# Patient Record
Sex: FEMALE | Race: WHITE | Employment: UNEMPLOYED | URBAN - METROPOLITAN AREA
[De-identification: names, ages, dates, MRNs, and addresses within clinical notes are randomized per-mention and may not be internally consistent; named-entity substitution may affect disease eponyms.]

---

## 2024-11-04 ENCOUNTER — OFFICE VISIT (OUTPATIENT)
Dept: ENDOCRINOLOGY | Facility: CLINIC | Age: 59
End: 2024-11-04

## 2024-11-04 VITALS
TEMPERATURE: 97.6 F | WEIGHT: 138 LBS | HEART RATE: 63 BPM | DIASTOLIC BLOOD PRESSURE: 58 MMHG | SYSTOLIC BLOOD PRESSURE: 100 MMHG | HEIGHT: 68 IN | BODY MASS INDEX: 20.92 KG/M2 | OXYGEN SATURATION: 98 %

## 2024-11-04 DIAGNOSIS — E61.1 IRON DEFICIENCY: ICD-10-CM

## 2024-11-04 DIAGNOSIS — E21.3 HYPERPARATHYROIDISM (HCC): Primary | ICD-10-CM

## 2024-11-04 DIAGNOSIS — E83.52 HYPERCALCEMIA: ICD-10-CM

## 2024-11-04 DIAGNOSIS — N91.2 AMENORRHEA: ICD-10-CM

## 2024-11-04 DIAGNOSIS — M85.80 OSTEOPENIA, UNSPECIFIED LOCATION: ICD-10-CM

## 2024-11-04 DIAGNOSIS — E03.8 OTHER SPECIFIED HYPOTHYROIDISM: ICD-10-CM

## 2024-11-04 PROCEDURE — 99205 OFFICE O/P NEW HI 60 MIN: CPT | Performed by: INTERNAL MEDICINE

## 2024-11-04 NOTE — ASSESSMENT & PLAN NOTE
She had all data consistent with a Rt inferior parathyroid adenoma as culprit for hypercalcemia but post surgery, she did not have improvement in either hypercalcemia or PTH levels.    Today we discussed all aspects of hyperparathyroidism, both primary and secondary, normal parathyroid physiology, pathophysiology, review of data, treatment options, rule of sestamibi/CT parathyroid and follow up needs.    We agreed to repeat complete testing again based on which we may decide further options.  Follow up in 3 months.

## 2024-11-04 NOTE — ASSESSMENT & PLAN NOTE
Today we discussed all aspects of osteoporosis including pathophysiology, risk factors, complications, diet, calcium 1200mg/day, vitamin D supplementation to maintain goal >30ng/dL, lifestyle modifications, medical fitness training, goals of therapy, follow up needs and medications including Alendronate, zolendronate, denosumab, romosozumab, foreo and tymlos.

## 2024-11-04 NOTE — ASSESSMENT & PLAN NOTE
She reports some symptoms that are consistent with hypothyroidism but there was no clear biochemical evidence.    Today we discussed all aspects of hypothyroidism including normal thyroid physiology, pathophysiology, review of data, treatment options, dose titration and follow up needs.    Continue levothyroxine 25mcg qdaily.

## 2024-11-04 NOTE — PROGRESS NOTES
"    New patient Note      CC: hyperparathyroidism    History of Present Illness:   58 yr female with Hx hypothyroidism diagnosed age 27(did not take meds until recently), menorrhagia s/p D&C 2/23, LMP 2022, hyperparathyroidism, hypercalcemia.     She felt severe fatigue associated with sleep disturbance, agitation, mood disturbance, brain fog and polyuria. She had initial labs 3/2024 showing hypothyroidism, hypercalcemia dn iron deficiency. No Hx nephrolithiasis.    24 hr urine 5/6/2024 - calcium was 611mg/24 hrs with 3.5liters UOP.  7/28/24 PTH 66pg/mL, calcium 11.4mg/dL, Alb 4.7g/dL, fT4 0.9 ng/dL, TSH  3.5uIU/mL  7/25/24 US thyroid: 1.3cm*0.8cm*0.8cm Rt circumscribes mass inferior  5/31/2024 Nuclear sestamibi: Rt inferior parathyroid adenoma.  5/6/2024 DXA: osteopenia -  T scores -1.1 LS, -1.4 LTH, -1.7 LFN. 10 yr FRAX 0.7% hip and 6.8% major fracture.  8/19/2024 Surgical parathyroidectomy - Rt inferior parathyroid    Physical Exam:  Body mass index is 20.98 kg/m².  /58 (BP Location: Left arm, Patient Position: Sitting, Cuff Size: Standard)   Pulse 63   Temp 97.6 °F (36.4 °C) (Temporal)   Ht 5' 8\" (1.727 m)   Wt 62.6 kg (138 lb)   SpO2 98%   BMI 20.98 kg/m²    Vitals:    11/04/24 1134   Weight: 62.6 kg (138 lb)        Physical Exam  Constitutional:       General: She is not in acute distress.     Appearance: She is well-developed.   HENT:      Head: Normocephalic and atraumatic.      Nose: Nose normal.   Eyes:      Conjunctiva/sclera: Conjunctivae normal.   Pulmonary:      Effort: Pulmonary effort is normal.   Abdominal:      General: There is no distension.   Musculoskeletal:      Cervical back: Normal range of motion and neck supple.   Skin:     Findings: No rash.      Comments: No icterus   Neurological:      Mental Status: She is alert and oriented to person, place, and time.         Labs:   No results found for: \"HGBA1C\"    No results found for: \"MAQ7ZUCVYUEO\", \"TSH\", \"J2TIOJQ\", \"P7YTKBQ\", " "\"THYROIDAB\"    No results found for: \"CREATININE\", \"BUN\", \"NA\", \"K\", \"CL\", \"CO2\"  No results found for: \"EGFR\"    No results found for: \"ALT\", \"AST\", \"GGT\", \"ALKPHOS\", \"BILITOT\"    No results found for: \"CHOLESTEROL\"  No results found for: \"HDL\"  No results found for: \"TRIG\"  No results found for: \"NONHDLC\"      Assessment/Plan:    1. Hyperparathyroidism (HCC)  Assessment & Plan:  She had all data consistent with a Rt inferior parathyroid adenoma as culprit for hypercalcemia but post surgery, she did not have improvement in either hypercalcemia or PTH levels.    Today we discussed all aspects of hyperparathyroidism, both primary and secondary, normal parathyroid physiology, pathophysiology, review of data, treatment options, rule of sestamibi/CT parathyroid and follow up needs.    We agreed to repeat complete testing again based on which we may decide further options.  Follow up in 3 months.    Orders:  -     Vitamin D 25 hydroxy; Future  -     PTH, intact; Future  -     Protein electrophoresis, serum; Future  -     Protein electrophoresis, urine; Future  -     Phosphorus; Future  -     Calcium, ionized; Future  -     Alkaline phosphatase, bone specific; Future  -     Comprehensive metabolic panel; Future  -     Creatinine, urine, 24 hour; Future  -     Calcium, urine, 24 hour; Future  2. Hypercalcemia  Assessment & Plan:  As above  Orders:  -     Vitamin D 25 hydroxy; Future  -     PTH, intact; Future  -     Protein electrophoresis, serum; Future  -     Protein electrophoresis, urine; Future  -     Phosphorus; Future  -     Calcium, ionized; Future  -     Alkaline phosphatase, bone specific; Future  -     Comprehensive metabolic panel; Future  -     Creatinine, urine, 24 hour; Future  -     Calcium, urine, 24 hour; Future  -     PTH-related peptide; Future  3. Iron deficiency  Assessment & Plan:  Advised to continue iron supplementation.  4. Other specified hypothyroidism  Assessment & Plan:  She reports some " symptoms that are consistent with hypothyroidism but there was no clear biochemical evidence.    Today we discussed all aspects of hypothyroidism including normal thyroid physiology, pathophysiology, review of data, treatment options, dose titration and follow up needs.    Continue levothyroxine 25mcg qdaily.      Orders:  -     T4, free; Future  -     TSH, 3rd generation; Future  -     Thyroid Antibodies Panel; Future  5. Osteopenia, unspecified location  Assessment & Plan:  Today we discussed all aspects of osteoporosis including pathophysiology, risk factors, complications, diet, calcium 1200mg/day, vitamin D supplementation to maintain goal >30ng/dL, lifestyle modifications, medical fitness training, goals of therapy, follow up needs and medications including Alendronate, zolendronate, denosumab, romosozumab, foreo and tymlos.  6. Amenorrhea  -     Estradiol; Future  -     Follicle stimulating hormone; Future; Expected date: 11/05/2024  -     Luteinizing hormone; Future  -     Prolactin; Future        I have spent a total time of 45 minutes on 11/04/24 in caring for this patient including greater than 50% of this time was spent in counseling/coordination of care as listed above.       Discussed with the patient and all questioned fully answered. She will contact me with concerns.    Kevin Ellis

## 2024-11-05 ENCOUNTER — APPOINTMENT (OUTPATIENT)
Dept: LAB | Facility: HOSPITAL | Age: 59
End: 2024-11-05
Attending: INTERNAL MEDICINE

## 2024-11-05 DIAGNOSIS — E83.52 HYPERCALCEMIA: ICD-10-CM

## 2024-11-05 DIAGNOSIS — E03.8 OTHER SPECIFIED HYPOTHYROIDISM: ICD-10-CM

## 2024-11-05 DIAGNOSIS — N91.2 AMENORRHEA: ICD-10-CM

## 2024-11-05 DIAGNOSIS — E21.3 HYPERPARATHYROIDISM (HCC): ICD-10-CM

## 2024-11-05 LAB
25(OH)D3 SERPL-MCNC: 29.8 NG/ML (ref 30–100)
ALBUMIN SERPL BCG-MCNC: 4.7 G/DL (ref 3.5–5)
ALP SERPL-CCNC: 81 U/L (ref 34–104)
ALT SERPL W P-5'-P-CCNC: 16 U/L (ref 7–52)
ANION GAP SERPL CALCULATED.3IONS-SCNC: 6 MMOL/L (ref 4–13)
AST SERPL W P-5'-P-CCNC: 22 U/L (ref 13–39)
BILIRUB SERPL-MCNC: 0.57 MG/DL (ref 0.2–1)
BUN SERPL-MCNC: 17 MG/DL (ref 5–25)
CA-I BLD-SCNC: 1.34 MMOL/L (ref 1.12–1.32)
CALCIUM SERPL-MCNC: 11.1 MG/DL (ref 8.4–10.2)
CHLORIDE SERPL-SCNC: 104 MMOL/L (ref 96–108)
CO2 SERPL-SCNC: 29 MMOL/L (ref 21–32)
CREAT SERPL-MCNC: 0.95 MG/DL (ref 0.6–1.3)
ESTRADIOL SERPL-MCNC: 37.3 PG/ML
FSH SERPL-ACNC: 85.3 MIU/ML
GFR SERPL CREATININE-BSD FRML MDRD: 67 ML/MIN/1.73SQ M
GLUCOSE P FAST SERPL-MCNC: 90 MG/DL (ref 65–99)
LH SERPL-ACNC: 56.3 MIU/ML
PHOSPHATE SERPL-MCNC: 3.4 MG/DL (ref 2.7–4.5)
POTASSIUM SERPL-SCNC: 4 MMOL/L (ref 3.5–5.3)
PROLACTIN SERPL-MCNC: 11.64 NG/ML (ref 2.74–19.64)
PROT SERPL-MCNC: 7.6 G/DL (ref 6.4–8.4)
PTH-INTACT SERPL-MCNC: 56.3 PG/ML (ref 12–88)
SODIUM SERPL-SCNC: 139 MMOL/L (ref 135–147)
T4 FREE SERPL-MCNC: 0.72 NG/DL (ref 0.61–1.12)
TSH SERPL DL<=0.05 MIU/L-ACNC: 4.78 UIU/ML (ref 0.45–4.5)

## 2024-11-05 PROCEDURE — 84166 PROTEIN E-PHORESIS/URINE/CSF: CPT

## 2024-11-05 PROCEDURE — 84443 ASSAY THYROID STIM HORMONE: CPT

## 2024-11-05 PROCEDURE — 86376 MICROSOMAL ANTIBODY EACH: CPT

## 2024-11-05 PROCEDURE — 82306 VITAMIN D 25 HYDROXY: CPT

## 2024-11-05 PROCEDURE — 82397 CHEMILUMINESCENT ASSAY: CPT

## 2024-11-05 PROCEDURE — 83002 ASSAY OF GONADOTROPIN (LH): CPT

## 2024-11-05 PROCEDURE — 84146 ASSAY OF PROLACTIN: CPT

## 2024-11-05 PROCEDURE — 36415 COLL VENOUS BLD VENIPUNCTURE: CPT

## 2024-11-05 PROCEDURE — 82670 ASSAY OF TOTAL ESTRADIOL: CPT

## 2024-11-05 PROCEDURE — 86800 THYROGLOBULIN ANTIBODY: CPT

## 2024-11-05 PROCEDURE — 82330 ASSAY OF CALCIUM: CPT

## 2024-11-05 PROCEDURE — 80053 COMPREHEN METABOLIC PANEL: CPT

## 2024-11-05 PROCEDURE — 84100 ASSAY OF PHOSPHORUS: CPT

## 2024-11-05 PROCEDURE — 84165 PROTEIN E-PHORESIS SERUM: CPT

## 2024-11-05 PROCEDURE — 84439 ASSAY OF FREE THYROXINE: CPT

## 2024-11-05 PROCEDURE — 84080 ASSAY ALKALINE PHOSPHATASES: CPT

## 2024-11-05 PROCEDURE — 83970 ASSAY OF PARATHORMONE: CPT

## 2024-11-05 PROCEDURE — 83001 ASSAY OF GONADOTROPIN (FSH): CPT

## 2024-11-06 LAB — THYROGLOB AB SERPL-ACNC: 1.4 IU/ML (ref 0–0.9)

## 2024-11-07 ENCOUNTER — APPOINTMENT (OUTPATIENT)
Dept: LAB | Facility: HOSPITAL | Age: 59
End: 2024-11-07

## 2024-11-07 DIAGNOSIS — E21.3 HYPERPARATHYROIDISM, UNSPECIFIED (HCC): ICD-10-CM

## 2024-11-07 DIAGNOSIS — E83.52 HYPERCALCEMIA: ICD-10-CM

## 2024-11-07 LAB
ALBUMIN SERPL ELPH-MCNC: 4.5 G/DL (ref 3.2–5.1)
ALBUMIN SERPL ELPH-MCNC: 61.6 % (ref 48–70)
ALBUMIN UR ELPH-MCNC: 100 %
ALP BONE SERPL-MCNC: 26.2 UG/L
ALPHA1 GLOB MFR UR ELPH: 0 %
ALPHA1 GLOB SERPL ELPH-MCNC: 0.21 G/DL (ref 0.15–0.47)
ALPHA1 GLOB SERPL ELPH-MCNC: 2.9 % (ref 1.8–7)
ALPHA2 GLOB MFR UR ELPH: 0 %
ALPHA2 GLOB SERPL ELPH-MCNC: 0.63 G/DL (ref 0.42–1.04)
ALPHA2 GLOB SERPL ELPH-MCNC: 8.6 % (ref 5.9–14.9)
B-GLOBULIN MFR UR ELPH: 0 %
BETA GLOB ABNORMAL SERPL ELPH-MCNC: 0.41 G/DL (ref 0.31–0.57)
BETA1 GLOB SERPL ELPH-MCNC: 5.6 % (ref 4.7–7.7)
BETA2 GLOB SERPL ELPH-MCNC: 5.6 % (ref 3.1–7.9)
BETA2+GAMMA GLOB SERPL ELPH-MCNC: 0.41 G/DL (ref 0.2–0.58)
CALCIUM 24H UR-MCNC: 427 MG/24 HRS (ref 100–300)
CREAT 24H UR-MRATE: 1.6 G/24HR (ref 0.6–1.8)
GAMMA GLOB ABNORMAL SERPL ELPH-MCNC: 1.15 G/DL (ref 0.4–1.66)
GAMMA GLOB MFR UR ELPH: 0 %
GAMMA GLOB SERPL ELPH-MCNC: 15.7 % (ref 6.9–22.3)
IGG/ALB SER: 1.6 {RATIO} (ref 1.1–1.8)
PROT PATTERN SERPL ELPH-IMP: NORMAL
PROT PATTERN UR ELPH-IMP: NORMAL
PROT SERPL-MCNC: 7.3 G/DL (ref 6.4–8.2)
PROT UR-MCNC: 5.9 MG/DL
SPECIMEN VOL UR: 3500 ML
SPECIMEN VOL UR: 3500 ML
THYROPEROXIDASE AB SERPL-ACNC: 159 IU/ML (ref 0–34)

## 2024-11-07 PROCEDURE — 82340 ASSAY OF CALCIUM IN URINE: CPT

## 2024-11-07 PROCEDURE — 84165 PROTEIN E-PHORESIS SERUM: CPT | Performed by: STUDENT IN AN ORGANIZED HEALTH CARE EDUCATION/TRAINING PROGRAM

## 2024-11-07 PROCEDURE — 82570 ASSAY OF URINE CREATININE: CPT

## 2024-11-07 PROCEDURE — 84166 PROTEIN E-PHORESIS/URINE/CSF: CPT | Performed by: STUDENT IN AN ORGANIZED HEALTH CARE EDUCATION/TRAINING PROGRAM

## 2024-11-13 LAB — PTH RELATED PROT SERPL-SCNC: 2.9 PMOL/L

## 2024-11-19 DIAGNOSIS — E21.0 PRIMARY HYPERPARATHYROIDISM (HCC): ICD-10-CM

## 2024-11-19 DIAGNOSIS — E21.3 HYPERPARATHYROIDISM (HCC): Primary | ICD-10-CM

## 2024-11-19 DIAGNOSIS — E03.8 OTHER SPECIFIED HYPOTHYROIDISM: ICD-10-CM

## 2024-11-19 RX ORDER — LEVOTHYROXINE SODIUM 50 UG/1
50 TABLET ORAL DAILY
Qty: 90 TABLET | Refills: 1 | Status: SHIPPED | OUTPATIENT
Start: 2024-11-19

## 2024-11-22 ENCOUNTER — OFFICE VISIT (OUTPATIENT)
Dept: INTERNAL MEDICINE CLINIC | Facility: CLINIC | Age: 59
End: 2024-11-22
Payer: COMMERCIAL

## 2024-11-22 VITALS
SYSTOLIC BLOOD PRESSURE: 120 MMHG | BODY MASS INDEX: 21.37 KG/M2 | HEIGHT: 68 IN | DIASTOLIC BLOOD PRESSURE: 62 MMHG | WEIGHT: 141 LBS | HEART RATE: 54 BPM | OXYGEN SATURATION: 100 % | TEMPERATURE: 96.8 F

## 2024-11-22 DIAGNOSIS — M54.50 CHRONIC BILATERAL LOW BACK PAIN WITHOUT SCIATICA: ICD-10-CM

## 2024-11-22 DIAGNOSIS — E21.3 HYPERPARATHYROIDISM (HCC): Primary | ICD-10-CM

## 2024-11-22 DIAGNOSIS — Z00.00 LABORATORY EXAMINATION ORDERED AS PART OF A ROUTINE GENERAL MEDICAL EXAMINATION: ICD-10-CM

## 2024-11-22 DIAGNOSIS — E03.8 OTHER SPECIFIED HYPOTHYROIDISM: ICD-10-CM

## 2024-11-22 DIAGNOSIS — G89.29 CHRONIC BILATERAL LOW BACK PAIN WITHOUT SCIATICA: ICD-10-CM

## 2024-11-22 DIAGNOSIS — E55.9 VITAMIN D DEFICIENCY: ICD-10-CM

## 2024-11-22 DIAGNOSIS — Z71.9 HEALTH COUNSELING: ICD-10-CM

## 2024-11-22 DIAGNOSIS — M25.551 RIGHT HIP PAIN: ICD-10-CM

## 2024-11-22 DIAGNOSIS — E78.49 OTHER HYPERLIPIDEMIA: ICD-10-CM

## 2024-11-22 DIAGNOSIS — M85.80 OSTEOPENIA, UNSPECIFIED LOCATION: ICD-10-CM

## 2024-11-22 DIAGNOSIS — E61.1 IRON DEFICIENCY: ICD-10-CM

## 2024-11-22 PROCEDURE — 99204 OFFICE O/P NEW MOD 45 MIN: CPT | Performed by: INTERNAL MEDICINE

## 2024-11-22 RX ORDER — AMOXICILLIN 500 MG
1 CAPSULE ORAL
Start: 2024-11-22

## 2024-11-22 RX ORDER — ACETAMINOPHEN 160 MG
2000 TABLET,DISINTEGRATING ORAL DAILY
Start: 2024-11-22

## 2024-11-22 NOTE — ASSESSMENT & PLAN NOTE
Start daily D3.    Orders:    Cholecalciferol (Vitamin D3) 50 MCG (2000 UT) capsule; Take 1 capsule (2,000 Units total) by mouth daily

## 2024-11-22 NOTE — PROGRESS NOTES
Name: Shawna Marrufo      : 1965      MRN: [id number]  Encounter Provider: Brenda Kaminski MD  Encounter Date: 2024   Encounter department: [facility] INTERNAL MEDICINE  :  Assessment & Plan  Hyperparathyroidism (HCC)  Saw Endo. CT scan scheduled. S/p surgery.  Reviewed labs.         Other specified hypothyroidism  Taking medication appropriately. She will ask regarding brand medication.  Per Endo.         Vitamin D deficiency  Start daily D3.    Orders:    Cholecalciferol (Vitamin D3) 50 MCG (2000 UT) capsule; Take 1 capsule (2,000 Units total) by mouth daily    Chronic bilateral low back pain without sciatica  Intermittent symptoms. May benefit with physical therapy.  Explained MRI not necessary at this time.    Orders:    XR spine lumbar minimum 4 views non injury; Future    XR sacroiliac joints < 3 views; Future    Right hip pain  Differential Dx: bursitis, hip OA, lumbar radiculopathy.    Orders:    XR hip/pelv 2-3 vws right if performed; Future    Iron deficiency  Recheck labs, not taking iron supplement anymore.  S/p iron infusion .    Orders:    CBC and differential; Future    Iron Panel (Includes Ferritin, Iron Sat%, Iron, and TIBC); Future    Other hyperlipidemia  Repeat lipids, taking omega-3 only.    Orders:    Lipid panel; Future    Omega-3 Fatty Acids (Fish Oil) 1200 MG CAPS; Take 1 capsule (1,200 mg total) by mouth Daily at 2am    Osteopenia, unspecified location         Laboratory examination ordered as part of a routine general medical examination         Health counseling  Declined flu vaccine. COVID vaccine updated.  May be due for mammogram. She reports updated FIT test.       Follow up in 6 months or as needed.  Retrieve previous records.       History of Present Illness     She recently moved the area.  She had a very stressful job in [place] and moved, her sister lives here.  Several concerns.  First, she had a parathyroid removed in [place],  recently saw endocrinology in the area.  Her calcium remains high.  She has not been taking vitamin D.  She has a CT scan scheduled.    Second, she has been taking levothyroxine while in [place].  She is not sure why she was given Synthroid instead.    Third, she was previously having issues with sleep.  Right now, she reports sleep has improved.  She reports occasional headaches.  She was told it may be due to too much water.  She does wake up several times at night to urinate.  She reports this has improved since she decreased her fluid intake.    Fourth, she received an iron infusion last May, stopped taking iron supplements.  She was also told she had high cholesterol, started taking fish oil capsules.    Fifth, she complains of intermittent low back pain.  She reports right hip pain several months ago.  She thinks it may be due to being seated for work all the time.  She reports no falls or injury.  Pain does not radiate down her legs.  She experiences occasional tingling of her thighs, this is not consistent.  She has not applied or taken any medication for her symptoms. She would lik an MRI.      Review of Systems   Constitutional:  Negative for activity change, appetite change and fatigue.   HENT:  Negative for congestion, ear pain and postnasal drip.    Eyes:  Negative for visual disturbance.   Respiratory:  Negative for cough and shortness of breath.    Cardiovascular:  Negative for chest pain and leg swelling.   Gastrointestinal:  Negative for abdominal pain, constipation and diarrhea.   Genitourinary:  Negative for dysuria, frequency and urgency.   Musculoskeletal:  Positive for arthralgias and back pain. Negative for gait problem and myalgias.   Skin:  Negative for rash and wound.   Neurological:  Positive for headaches. Negative for dizziness and numbness.   Hematological:  Does not bruise/bleed easily.   Psychiatric/Behavioral:  Negative for confusion. The patient is not nervous/anxious.      Past  "Medical History   History reviewed. No pertinent past medical history.  Past Surgical History:   Procedure Laterality Date    PARATHYROID GLAND SURGERY       Family History   Problem Relation Age of Onset    Stroke Mother     Dementia Father     Coronary artery disease Brother       reports that she has never smoked. She has never used smokeless tobacco. She reports current alcohol use of about 3.0 standard drinks of alcohol per week. She reports that she does not use drugs.  Current Outpatient Medications on File Prior to Visit   Medication Sig Dispense Refill    Euthyrox 50 MCG tablet Take 1 tablet (50 mcg total) by mouth daily 90 tablet 1     No current facility-administered medications on file prior to visit.     Allergies   Allergen Reactions    Sulfa Antibiotics Rash           Objective   /62   Pulse (!) 54   Temp (!) 96.8 °F (36 °C)   Ht 5' 8\" (1.727 m)   Wt 64 kg (141 lb)   SpO2 100%   BMI 21.44 kg/m²      Physical Exam  Vitals and nursing note reviewed.   Constitutional:       General: She is not in acute distress.     Appearance: She is well-developed.   HENT:      Head: Normocephalic and atraumatic.   Eyes:      Pupils: Pupils are equal, round, and reactive to light.   Cardiovascular:      Rate and Rhythm: Normal rate and regular rhythm.      Heart sounds: Normal heart sounds.   Pulmonary:      Effort: Pulmonary effort is normal.      Breath sounds: Normal breath sounds. No wheezing.   Abdominal:      General: Bowel sounds are normal.      Palpations: Abdomen is soft.   Musculoskeletal:         General: No swelling.      Right lower leg: No edema.      Left lower leg: No edema.   Skin:     General: Skin is warm.      Findings: No rash.   Neurological:      General: No focal deficit present.      Mental Status: She is alert and oriented to person, place, and time.   Psychiatric:         Mood and Affect: Mood and affect normal. Mood is not anxious or depressed.         Behavior: Behavior normal. "           Reviewed available records.  Labs & imaging results reviewed with patient.

## 2024-11-22 NOTE — ASSESSMENT & PLAN NOTE
Recheck labs, not taking iron supplement anymore.  S/p iron infusion 5/24.    Orders:    CBC and differential; Future    Iron Panel (Includes Ferritin, Iron Sat%, Iron, and TIBC); Future

## 2024-11-25 ENCOUNTER — RESULTS FOLLOW-UP (OUTPATIENT)
Dept: OTHER | Facility: HOSPITAL | Age: 59
End: 2024-11-25

## 2024-11-25 DIAGNOSIS — E03.8 OTHER SPECIFIED HYPOTHYROIDISM: ICD-10-CM

## 2024-11-25 RX ORDER — LEVOTHYROXINE SODIUM 50 MCG
50 TABLET ORAL DAILY
Qty: 90 TABLET | Refills: 0 | Status: SHIPPED | OUTPATIENT
Start: 2024-11-25 | End: 2024-11-25 | Stop reason: SDUPTHER

## 2024-11-25 RX ORDER — LEVOTHYROXINE SODIUM 50 UG/1
50 TABLET ORAL DAILY
Qty: 90 TABLET | Refills: 0 | Status: SHIPPED | OUTPATIENT
Start: 2024-11-25

## 2024-12-02 ENCOUNTER — HOSPITAL ENCOUNTER (OUTPATIENT)
Dept: RADIOLOGY | Facility: HOSPITAL | Age: 59
Discharge: HOME | End: 2024-12-02
Payer: COMMERCIAL

## 2024-12-02 ENCOUNTER — APPOINTMENT (OUTPATIENT)
Dept: LAB | Facility: CLINIC | Age: 59
End: 2024-12-02
Payer: COMMERCIAL

## 2024-12-02 ENCOUNTER — PATIENT MESSAGE (OUTPATIENT)
Dept: INTERNAL MEDICINE CLINIC | Facility: CLINIC | Age: 59
End: 2024-12-02

## 2024-12-02 DIAGNOSIS — E61.1 IRON DEFICIENCY: Primary | ICD-10-CM

## 2024-12-02 DIAGNOSIS — E78.49 OTHER HYPERLIPIDEMIA: ICD-10-CM

## 2024-12-02 DIAGNOSIS — M54.50 CHRONIC BILATERAL LOW BACK PAIN WITHOUT SCIATICA: ICD-10-CM

## 2024-12-02 DIAGNOSIS — M25.551 RIGHT HIP PAIN: ICD-10-CM

## 2024-12-02 DIAGNOSIS — E61.1 IRON DEFICIENCY: ICD-10-CM

## 2024-12-02 DIAGNOSIS — G89.29 CHRONIC BILATERAL LOW BACK PAIN WITHOUT SCIATICA: ICD-10-CM

## 2024-12-02 LAB
BASOPHILS # BLD AUTO: 0.06 THOUSANDS/ΜL (ref 0–0.1)
BASOPHILS NFR BLD AUTO: 1 % (ref 0–1)
EOSINOPHIL # BLD AUTO: 0.02 THOUSAND/ΜL (ref 0–0.61)
EOSINOPHIL NFR BLD AUTO: 0 % (ref 0–6)
ERYTHROCYTE [DISTWIDTH] IN BLOOD BY AUTOMATED COUNT: 12.3 % (ref 11.6–15.1)
FERRITIN SERPL-MCNC: 403 NG/ML (ref 11–307)
HCT VFR BLD AUTO: 36 % (ref 34.8–46.1)
HGB BLD-MCNC: 11.7 G/DL (ref 11.5–15.4)
IMM GRANULOCYTES # BLD AUTO: 0.01 THOUSAND/UL (ref 0–0.2)
IMM GRANULOCYTES NFR BLD AUTO: 0 % (ref 0–2)
IRON SATN MFR SERPL: 24 % (ref 15–50)
IRON SERPL-MCNC: 75 UG/DL (ref 50–212)
LYMPHOCYTES # BLD AUTO: 3.76 THOUSANDS/ΜL (ref 0.6–4.47)
LYMPHOCYTES NFR BLD AUTO: 65 % (ref 14–44)
MCH RBC QN AUTO: 30.4 PG (ref 26.8–34.3)
MCHC RBC AUTO-ENTMCNC: 32.5 G/DL (ref 31.4–37.4)
MCV RBC AUTO: 94 FL (ref 82–98)
MONOCYTES # BLD AUTO: 0.41 THOUSAND/ΜL (ref 0.17–1.22)
MONOCYTES NFR BLD AUTO: 7 % (ref 4–12)
NEUTROPHILS # BLD AUTO: 1.53 THOUSANDS/ΜL (ref 1.85–7.62)
NEUTS SEG NFR BLD AUTO: 27 % (ref 43–75)
NRBC BLD AUTO-RTO: 0 /100 WBCS
PLATELET # BLD AUTO: 203 THOUSANDS/UL (ref 149–390)
PMV BLD AUTO: 9.6 FL (ref 8.9–12.7)
RBC # BLD AUTO: 3.85 MILLION/UL (ref 3.81–5.12)
TIBC SERPL-MCNC: 310 UG/DL (ref 250–450)
UIBC SERPL-MCNC: 235 UG/DL (ref 155–355)
WBC # BLD AUTO: 5.79 THOUSAND/UL (ref 4.31–10.16)

## 2024-12-02 PROCEDURE — 85025 COMPLETE CBC W/AUTO DIFF WBC: CPT

## 2024-12-02 PROCEDURE — 73502 X-RAY EXAM HIP UNI 2-3 VIEWS: CPT

## 2024-12-02 PROCEDURE — 72110 X-RAY EXAM L-2 SPINE 4/>VWS: CPT

## 2024-12-02 PROCEDURE — 36415 COLL VENOUS BLD VENIPUNCTURE: CPT

## 2024-12-02 PROCEDURE — 72200 X-RAY EXAM SI JOINTS: CPT

## 2024-12-02 PROCEDURE — 83550 IRON BINDING TEST: CPT

## 2024-12-02 PROCEDURE — 83540 ASSAY OF IRON: CPT

## 2024-12-02 PROCEDURE — 82728 ASSAY OF FERRITIN: CPT

## 2024-12-09 ENCOUNTER — HOSPITAL ENCOUNTER (OUTPATIENT)
Dept: CT IMAGING | Facility: HOSPITAL | Age: 59
Discharge: HOME | End: 2024-12-09
Attending: INTERNAL MEDICINE
Payer: COMMERCIAL

## 2024-12-09 DIAGNOSIS — E21.0 PRIMARY HYPERPARATHYROIDISM (HCC): ICD-10-CM

## 2024-12-09 DIAGNOSIS — E21.3 HYPERPARATHYROIDISM (HCC): ICD-10-CM

## 2024-12-09 PROCEDURE — 70492 CT SFT TSUE NCK W/O & W/DYE: CPT

## 2024-12-09 RX ADMIN — IOHEXOL 75 ML: 350 INJECTION, SOLUTION INTRAVENOUS at 07:29

## 2024-12-10 ENCOUNTER — TELEPHONE (OUTPATIENT)
Age: 59
End: 2024-12-10

## 2024-12-10 NOTE — TELEPHONE ENCOUNTER
[facility] Radiology    12/09/24 CT Parathyroid   Dr. Ellis    Significant Finding

## 2024-12-17 DIAGNOSIS — E21.3 HYPERPARATHYROIDISM (HCC): Primary | ICD-10-CM

## 2024-12-17 DIAGNOSIS — E21.0 PRIMARY HYPERPARATHYROIDISM (HCC): ICD-10-CM

## 2024-12-23 ENCOUNTER — OFFICE VISIT (OUTPATIENT)
Dept: SURGICAL ONCOLOGY | Facility: CLINIC | Age: 59
End: 2024-12-23
Payer: COMMERCIAL

## 2024-12-23 VITALS
OXYGEN SATURATION: 99 % | TEMPERATURE: 98.2 F | HEART RATE: 73 BPM | BODY MASS INDEX: 20.95 KG/M2 | SYSTOLIC BLOOD PRESSURE: 124 MMHG | HEIGHT: 68 IN | DIASTOLIC BLOOD PRESSURE: 66 MMHG | WEIGHT: 138.2 LBS | RESPIRATION RATE: 16 BRPM

## 2024-12-23 DIAGNOSIS — E21.0 PRIMARY HYPERPARATHYROIDISM (HCC): ICD-10-CM

## 2024-12-23 DIAGNOSIS — E83.52 HYPERCALCEMIA: ICD-10-CM

## 2024-12-23 DIAGNOSIS — E21.3 HYPERPARATHYROIDISM (HCC): Primary | ICD-10-CM

## 2024-12-23 PROCEDURE — 99244 OFF/OP CNSLTJ NEW/EST MOD 40: CPT | Performed by: SURGERY

## 2024-12-23 NOTE — PROGRESS NOTES
Surgical Oncology Consult       [facility] [place]  [address] [facility] [address]  [phone]    Shawna Keithorestesdebbie  1965  [id number]  [facility] [place]  [address] [facility] [address]  [phone]    Chief Complaint   Patient presents with    Consult     Patient being seen for consult for PTH 56.3, Ca 11.1. CT para 12/9/2024- poss right.       Assessment/Plan:    No problem-specific Assessment & Plan notes found for this encounter.       Diagnoses and all orders for this visit:    Hyperparathyroidism (HCC)    Hypercalcemia    Primary hyperparathyroidism (HCC)  -     Ambulatory referral to Surgical Oncology      Advance Care Planning/Advance Directives:  Discussed disease status, cancer treatment plans and/or cancer treatment goals with the patient.     Oncology History    No history exists.       History of Present Illness: 59-year-old woman recently moved here from [place] where she underwent parathyroidectomy in August of this year.  She has had persistent symptoms as well as persistent hypercalcemia.  She underwent CAT scan here which was suggestive of a right-sided target.  She has been referred for evaluation and treatment.  No history of kidney stones.  However she has issues with achiness, fatigue, constipation, and foggy thinking.  She states that she has a general sense of malaise.  No personal or family history of endocrine malignancies.    Review of Systems   Constitutional:  Positive for fatigue.   Eyes: Negative.    Respiratory: Negative.     Cardiovascular: Negative.    Gastrointestinal:  Positive for constipation.   Genitourinary: Negative.    Skin: Negative.    Allergic/Immunologic: Negative.    Hematological: Negative.    Psychiatric/Behavioral:          Difficulty sleeping.  Foggy thinking.         Patient Active Problem List    Diagnosis    Hyperparathyroidism (HCC)    Hypercalcemia    Other specified hypothyroidism    Iron deficiency    Osteopenia    Vitamin D deficiency     Past Medical History:   Diagnosis Date    Disease of thyroid gland     Headache(784.0)      Past Surgical History:   Procedure Laterality Date    KNEE SURGERY      PARATHYROID GLAND SURGERY       Family History   Problem Relation Age of Onset    Stroke Mother         She is 90. Stroke occurred 1 year ago.    Dementia Father     Coronary artery disease Brother     Heart disease Brother     Thyroid disease Sister      Social History     Socioeconomic History    Marital status: Single     Spouse name: Not on file    Number of children: Not on file    Years of education: Not on file    Highest education level: Not on file   Occupational History    Not on file   Tobacco Use    Smoking status: Never    Smokeless tobacco: Never   Vaping Use    Vaping status: Never Used   Substance and Sexual Activity    Alcohol use: Yes     Alcohol/week: 3.0 standard drinks of alcohol     Types: 3 Glasses of wine per week    Drug use: Never    Sexual activity: Not Currently     Partners: Male     Birth control/protection: Post-menopausal   Other Topics Concern    Not on file   Social History Narrative    Single    Previous work - in HR in [place]    Working - [employer]     Social Drivers of Health     Financial Resource Strain: Not on file   Food Insecurity: Not on file   Transportation Needs: Not on file   Physical Activity: Not on file   Stress: Not on file   Social Connections: Not on file   Intimate Partner Violence: Not on file   Housing Stability: Not on file       Current Outpatient Medications:     Cholecalciferol (Vitamin D3) 50 MCG (2000 UT) capsule, Take 1 capsule (2,000 Units total) by mouth daily, Disp: , Rfl:     levothyroxine (Synthroid) 50 mcg tablet, Take 1 tablet (50 mcg total) by mouth daily, Disp: 90 tablet, Rfl: 0    Omega-3 Fatty Acids (Fish Oil) 1200 MG CAPS, Take  1 capsule (1,200 mg total) by mouth Daily at 2am, Disp: , Rfl:   Allergies   Allergen Reactions    Sulfa Antibiotics Rash     Vitals:    12/23/24 1141   BP: 124/66   Pulse: 73   Resp: 16   Temp: 98.2 °F (36.8 °C)   SpO2: 99%       Physical Exam  Vitals reviewed.   Constitutional:       Appearance: Normal appearance.   HENT:      Head: Normocephalic and atraumatic.      Right Ear: External ear normal.      Left Ear: External ear normal.   Eyes:      Extraocular Movements: Extraocular movements intact.      Pupils: Pupils are equal, round, and reactive to light.   Cardiovascular:      Rate and Rhythm: Normal rate and regular rhythm.      Pulses: Normal pulses.      Heart sounds: Normal heart sounds.   Pulmonary:      Effort: Pulmonary effort is normal.      Breath sounds: Normal breath sounds.   Abdominal:      General: Abdomen is flat.      Palpations: Abdomen is soft.   Genitourinary:     General: Normal vulva.      Rectum: Normal.   Musculoskeletal:         General: No tenderness. Normal range of motion.      Cervical back: Normal range of motion and neck supple.   Skin:     General: Skin is warm and dry.   Neurological:      General: No focal deficit present.      Mental Status: She is alert and oriented to person, place, and time.   Psychiatric:         Mood and Affect: Mood normal.         Thought Content: Thought content normal.         Pathology:      Labs:  Lab Results   Component Value Date    PTH 56.3 11/05/2024    CALCIUM 11.1 (H) 11/05/2024    PHOS 3.4 11/05/2024         Imaging  CT parathyroid study w wo contrast  Result Date: 12/10/2024  Narrative: CTA  NECK  WITHOUT AND WITH CONTRAST FROM KERA TO SKULL BASE INDICATION: Hyperparathyroidism. primary hyperparathyroidism COMPARISON:  None. TECHNIQUE:  Both noncontrast, contrast, and post contrast delayed images were performed according to standard parathyroid protocol (4D technique) Coronal and sagittal reconstructions were performed. 3D reconstructions  were performed on an independent workstation, and are supplied for review.  This examination, like all CT scans performed in the [facility], was performed utilizing techniques to minimize radiation dose exposure, including the use of iterative reconstruction and automated exposure control.  Rad dose 1355.51 mGy-cm IV Contrast:  75 mL of iohexol (OMNIPAQUE) FINDINGS: SERIAL NONCONTRAST, POST CONTRAST AND DELAYS: 0.4 cm enhancing nodule in right tracheoesophageal groove posteriorly adjacent to right lower thyroid lobe (302:160). 1.0 cm heterogeneously enhancing nodule with internal cystic change and tiny calcification inferiorly adjacent to right lower thyroid lobe (302:147). VASCULAR STRUCTURES:  Normal enhancement of the cervical vasculature. Left ICA cervical loop and tortuous right ICA distal cervical segment. VISUALIZED BRAIN PARENCHYMA: Not included in the field-of-view. VISUALIZED PARANASAL SINUSES:  Normal. NASAL CAVITY AND NASOPHARYNX: Normal. SUPRAHYOID NECK: Dental amalgam with extensive beam hardening artifact even with metal artifact reduction limits evaluation of anterior oral cavity for which direct visualization is recommended. Small circumscribed hyperdensity in right buccal mucosa (301:292), likely ingested material. Otherwise, normal visualized oral cavity within limitations. Normal oropharynx, parapharyngeal and retropharyngeal spaces. INFRAHYOID NECK: Larynx and hypopharynx are normal. Glottic and subglottic airway are normal. THYROID GLAND:   Heterogeneously enhancing thyroid gland with bilateral subcentimeter nodules. Incidental discovery of one or more thyroid nodule(s) measuring less than 1.5 cm and without suspicious features is noted in this patient who is above 35 years  old; according to guidelines published in the February 2015 white paper on incidental thyroid nodules in the Journal of the American College of Radiology (JACR), no further evaluation is recommended. LYMPH  NODES:  No pathologic or enlarged adenopathy. MEDIASTINUM:  Unremarkable. BONY STRUCTURES: No acute fracture or destructive osseous lesion. Moderate multilevel degenerative changes of cervical spine, worse at C5-C6. LUNG APICES: Mild biapical fibrotic change. No focal consolidation in visualized upper lung zones. NASCET criteria was used to determine the degree of internal carotid artery diameter stenosis.     Impression: 0.4 cm candidate parathyroid adenoma in right tracheoesophageal groove posteriorly adjacent to right lower thyroid lobe 1.0 cm candidate parathyroid adenoma inferiorly adjacent to right lower thyroid lobe. Alternative consideration includes sequestered thyroid nodule. Small circumscribed hyperdensity in right buccal mucosa, likely ingested material (i.e. gum or hard candy). Recommend direct visualization for further evaluation. Additional chronic/incidental findings as detailed above. The study was marked in EPIC for significant notification. Workstation performed: [id number]     XR spine lumbar minimum 4 views non injury  Result Date: 12/3/2024  Narrative: LUMBAR SPINE INDICATION:   Low back pain, unspecified. Other chronic pain. COMPARISON:  None. VIEWS:  XR SPINE LUMBAR MINIMUM 4 VIEWS NON INJURY FINDINGS: There are 5 non rib bearing lumbar vertebral bodies. There is no destructive osseous lesion. Alignment is unremarkable. Mild disc space narrowing at L4-L5. The pedicles appear intact. Soft tissues are unremarkable.     Impression: Mild disc space narrowing at L4-L5. Electronically signed: 12/03/2024 09:38 AM Velasquez Ruiz MD    XR hip/pelv 2-3 vws right if performed  Result Date: 12/3/2024  Narrative: RIGHT HIP INDICATION:   Pain in right hip. COMPARISON:  None. VIEWS:  XR HIP/PELV 2-3 VWS RIGHT W PELVIS IF PERFORMED FINDINGS: There is no acute displaced fracture or dislocation. Slight right coxa profunda. Minimal degenerative changes right hip. No lytic or blastic osseous lesion. Soft tissues  are unremarkable. The visualized lumbar spine is unremarkable.     Impression: Slight right coxa profunda. Minimal degenerative changes right hip. Electronically signed: 12/03/2024 09:37 AM Velasquez Ruiz MD    XR sacroiliac joints < 3 views  Result Date: 12/3/2024  Narrative: SACROILIAC JOINTS INDICATION:   Low back pain, unspecified. Other chronic pain. COMPARISON:  None. VIEWS:  XR SACROILIAC JOINTS < 3 VIEWS FINDINGS: The SI joints appear symmetric without focal erosions or joint space widening. Sacral arcuate lines appear intact. No fracture or pathologic bone lesions seen. Included portions of the pelvis and lumbar spine are unremarkable.     Impression: Unremarkable SI joints. Electronically signed: 12/03/2024 09:36 AM Velasquez Ruiz MD    I reviewed the above laboratory and imaging data.    Discussion/Summary: 59-year-old woman, primary hyperparathyroidism.  Right-sided target noted on CAT scan.  She would be amenable to reexploration to address what appears to be residual disease.  Will order sestamibi scan to further clarify whether or not this CT scan finding is truly a target.  Will also obtain her operative records from her last operation.  Will give her a date for surgery and see her 1 month prior for preop and consent.

## 2024-12-23 NOTE — LETTER
December 23, 2024     Brenda Kaminski MD  [address]  [address]  [facility] [address]    Patient: Shawna Marrufo   YOB: 1965   Date of Visit: 12/23/2024       Dear Dr. Kaminski:    Thank you for referring Shawna Marrufo to me for evaluation. Below are my notes for this consultation.    If you have questions, please do not hesitate to call me. I look forward to following your patient along with you.         Sincerely,        Jose Juan Zuniga MD        CC: MD Shawna Rogers MD  12/23/2024 12:28 PM  Sign when Signing Visit               Surgical Oncology Consult       [facility] [place]  [address] [facility] [address]  [phone]    Shawna Marrufo  1965  [id number]  [facility] [place]  [address] [facility] [address]  [phone]    Chief Complaint   Patient presents with   • Consult     Patient being seen for consult for PTH 56.3, Ca 11.1. CT para 12/9/2024- poss right.       Assessment/Plan:    No problem-specific Assessment & Plan notes found for this encounter.       Diagnoses and all orders for this visit:    Hyperparathyroidism (HCC)    Hypercalcemia    Primary hyperparathyroidism (HCC)  -     Ambulatory referral to Surgical Oncology      Advance Care Planning/Advance Directives:  Discussed disease status, cancer treatment plans and/or cancer treatment goals with the patient.     Oncology History    No history exists.       History of Present Illness: 59-year-old woman recently moved here from [place] where she underwent parathyroidectomy in August of this year.  She has had persistent symptoms as well as persistent hypercalcemia.  She underwent CAT scan here which was suggestive of a right-sided target.  She has been referred for evaluation and treatment.  No history of  kidney stones.  However she has issues with achiness, fatigue, constipation, and foggy thinking.  She states that she has a general sense of malaise.  No personal or family history of endocrine malignancies.    Review of Systems   Constitutional:  Positive for fatigue.   Eyes: Negative.    Respiratory: Negative.     Cardiovascular: Negative.    Gastrointestinal:  Positive for constipation.   Genitourinary: Negative.    Skin: Negative.    Allergic/Immunologic: Negative.    Hematological: Negative.    Psychiatric/Behavioral:          Difficulty sleeping.  Foggy thinking.         Patient Active Problem List   Diagnosis   • Hyperparathyroidism (HCC)   • Hypercalcemia   • Other specified hypothyroidism   • Iron deficiency   • Osteopenia   • Vitamin D deficiency     Past Medical History:   Diagnosis Date   • Disease of thyroid gland    • Headache(784.0)      Past Surgical History:   Procedure Laterality Date   • KNEE SURGERY     • PARATHYROID GLAND SURGERY       Family History   Problem Relation Age of Onset   • Stroke Mother         She is 90. Stroke occurred 1 year ago.   • Dementia Father    • Coronary artery disease Brother    • Heart disease Brother    • Thyroid disease Sister      Social History     Socioeconomic History   • Marital status: Single     Spouse name: Not on file   • Number of children: Not on file   • Years of education: Not on file   • Highest education level: Not on file   Occupational History   • Not on file   Tobacco Use   • Smoking status: Never   • Smokeless tobacco: Never   Vaping Use   • Vaping status: Never Used   Substance and Sexual Activity   • Alcohol use: Yes     Alcohol/week: 3.0 standard drinks of alcohol     Types: 3 Glasses of wine per week   • Drug use: Never   • Sexual activity: Not Currently     Partners: Male     Birth control/protection: Post-menopausal   Other Topics Concern   • Not on file   Social History Narrative    Single    Previous work - in  in [place]    Working -  [employer]     Social Drivers of Health     Financial Resource Strain: Not on file   Food Insecurity: Not on file   Transportation Needs: Not on file   Physical Activity: Not on file   Stress: Not on file   Social Connections: Not on file   Intimate Partner Violence: Not on file   Housing Stability: Not on file       Current Outpatient Medications:   •  Cholecalciferol (Vitamin D3) 50 MCG (2000 UT) capsule, Take 1 capsule (2,000 Units total) by mouth daily, Disp: , Rfl:   •  levothyroxine (Synthroid) 50 mcg tablet, Take 1 tablet (50 mcg total) by mouth daily, Disp: 90 tablet, Rfl: 0  •  Omega-3 Fatty Acids (Fish Oil) 1200 MG CAPS, Take 1 capsule (1,200 mg total) by mouth Daily at 2am, Disp: , Rfl:   Allergies   Allergen Reactions   • Sulfa Antibiotics Rash     Vitals:    12/23/24 1141   BP: 124/66   Pulse: 73   Resp: 16   Temp: 98.2 °F (36.8 °C)   SpO2: 99%       Physical Exam  Vitals reviewed.   Constitutional:       Appearance: Normal appearance.   HENT:      Head: Normocephalic and atraumatic.      Right Ear: External ear normal.      Left Ear: External ear normal.   Eyes:      Extraocular Movements: Extraocular movements intact.      Pupils: Pupils are equal, round, and reactive to light.   Cardiovascular:      Rate and Rhythm: Normal rate and regular rhythm.      Pulses: Normal pulses.      Heart sounds: Normal heart sounds.   Pulmonary:      Effort: Pulmonary effort is normal.      Breath sounds: Normal breath sounds.   Abdominal:      General: Abdomen is flat.      Palpations: Abdomen is soft.   Genitourinary:     General: Normal vulva.      Rectum: Normal.   Musculoskeletal:         General: No tenderness. Normal range of motion.      Cervical back: Normal range of motion and neck supple.   Skin:     General: Skin is warm and dry.   Neurological:      General: No focal deficit present.      Mental Status: She is alert and oriented to person, place, and time.   Psychiatric:         Mood and Affect: Mood  normal.         Thought Content: Thought content normal.         Pathology:      Labs:  Lab Results   Component Value Date    PTH 56.3 11/05/2024    CALCIUM 11.1 (H) 11/05/2024    PHOS 3.4 11/05/2024         Imaging  CT parathyroid study w wo contrast  Result Date: 12/10/2024  Narrative: CTA  NECK  WITHOUT AND WITH CONTRAST FROM KERA TO SKULL BASE INDICATION: Hyperparathyroidism. primary hyperparathyroidism COMPARISON:  None. TECHNIQUE:  Both noncontrast, contrast, and post contrast delayed images were performed according to standard parathyroid protocol (4D technique) Coronal and sagittal reconstructions were performed. 3D reconstructions were performed on an independent workstation, and are supplied for review.  This examination, like all CT scans performed in the [facility], was performed utilizing techniques to minimize radiation dose exposure, including the use of iterative reconstruction and automated exposure control.  Rad dose 1355.51 mGy-cm IV Contrast:  75 mL of iohexol (OMNIPAQUE) FINDINGS: SERIAL NONCONTRAST, POST CONTRAST AND DELAYS: 0.4 cm enhancing nodule in right tracheoesophageal groove posteriorly adjacent to right lower thyroid lobe (302:160). 1.0 cm heterogeneously enhancing nodule with internal cystic change and tiny calcification inferiorly adjacent to right lower thyroid lobe (302:147). VASCULAR STRUCTURES:  Normal enhancement of the cervical vasculature. Left ICA cervical loop and tortuous right ICA distal cervical segment. VISUALIZED BRAIN PARENCHYMA: Not included in the field-of-view. VISUALIZED PARANASAL SINUSES:  Normal. NASAL CAVITY AND NASOPHARYNX: Normal. SUPRAHYOID NECK: Dental amalgam with extensive beam hardening artifact even with metal artifact reduction limits evaluation of anterior oral cavity for which direct visualization is recommended. Small circumscribed hyperdensity in right buccal mucosa (301:292), likely ingested material. Otherwise, normal visualized  oral cavity within limitations. Normal oropharynx, parapharyngeal and retropharyngeal spaces. INFRAHYOID NECK: Larynx and hypopharynx are normal. Glottic and subglottic airway are normal. THYROID GLAND:   Heterogeneously enhancing thyroid gland with bilateral subcentimeter nodules. Incidental discovery of one or more thyroid nodule(s) measuring less than 1.5 cm and without suspicious features is noted in this patient who is above 35 years  old; according to guidelines published in the February 2015 white paper on incidental thyroid nodules in the Journal of the American College of Radiology (JACR), no further evaluation is recommended. LYMPH NODES:  No pathologic or enlarged adenopathy. MEDIASTINUM:  Unremarkable. BONY STRUCTURES: No acute fracture or destructive osseous lesion. Moderate multilevel degenerative changes of cervical spine, worse at C5-C6. LUNG APICES: Mild biapical fibrotic change. No focal consolidation in visualized upper lung zones. NASCET criteria was used to determine the degree of internal carotid artery diameter stenosis.     Impression: 0.4 cm candidate parathyroid adenoma in right tracheoesophageal groove posteriorly adjacent to right lower thyroid lobe 1.0 cm candidate parathyroid adenoma inferiorly adjacent to right lower thyroid lobe. Alternative consideration includes sequestered thyroid nodule. Small circumscribed hyperdensity in right buccal mucosa, likely ingested material (i.e. gum or hard candy). Recommend direct visualization for further evaluation. Additional chronic/incidental findings as detailed above. The study was marked in EPIC for significant notification. Workstation performed: [id number]     XR spine lumbar minimum 4 views non injury  Result Date: 12/3/2024  Narrative: LUMBAR SPINE INDICATION:   Low back pain, unspecified. Other chronic pain. COMPARISON:  None. VIEWS:  XR SPINE LUMBAR MINIMUM 4 VIEWS NON INJURY FINDINGS: There are 5 non rib bearing lumbar vertebral bodies.  There is no destructive osseous lesion. Alignment is unremarkable. Mild disc space narrowing at L4-L5. The pedicles appear intact. Soft tissues are unremarkable.     Impression: Mild disc space narrowing at L4-L5. Electronically signed: 12/03/2024 09:38 AM Velasquez Ruiz MD    XR hip/pelv 2-3 vws right if performed  Result Date: 12/3/2024  Narrative: RIGHT HIP INDICATION:   Pain in right hip. COMPARISON:  None. VIEWS:  XR HIP/PELV 2-3 VWS RIGHT W PELVIS IF PERFORMED FINDINGS: There is no acute displaced fracture or dislocation. Slight right coxa profunda. Minimal degenerative changes right hip. No lytic or blastic osseous lesion. Soft tissues are unremarkable. The visualized lumbar spine is unremarkable.     Impression: Slight right coxa profunda. Minimal degenerative changes right hip. Electronically signed: 12/03/2024 09:37 AM Velasquez Ruiz MD    XR sacroiliac joints < 3 views  Result Date: 12/3/2024  Narrative: SACROILIAC JOINTS INDICATION:   Low back pain, unspecified. Other chronic pain. COMPARISON:  None. VIEWS:  XR SACROILIAC JOINTS < 3 VIEWS FINDINGS: The SI joints appear symmetric without focal erosions or joint space widening. Sacral arcuate lines appear intact. No fracture or pathologic bone lesions seen. Included portions of the pelvis and lumbar spine are unremarkable.     Impression: Unremarkable SI joints. Electronically signed: 12/03/2024 09:36 AM Velasquez Ruiz MD    I reviewed the above laboratory and imaging data.    Discussion/Summary: 59-year-old woman, primary hyperparathyroidism.  Right-sided target noted on CAT scan.  She would be amenable to reexploration to address what appears to be residual disease.  Will order sestamibi scan to further clarify whether or not this CT scan finding is truly a target.  Will also obtain her operative records from her last operation.  Will give her a date for surgery and see her 1 month prior for preop and consent.

## 2025-01-03 ENCOUNTER — HOSPITAL ENCOUNTER (OUTPATIENT)
Dept: NUCLEAR MEDICINE | Facility: HOSPITAL | Age: 60
Discharge: HOME | End: 2025-01-03
Attending: SURGERY
Payer: COMMERCIAL

## 2025-01-03 DIAGNOSIS — E21.3 HYPERPARATHYROIDISM (HCC): ICD-10-CM

## 2025-01-03 PROCEDURE — A9500 TC99M SESTAMIBI: HCPCS

## 2025-01-03 PROCEDURE — 78072 PARATHYRD PLANAR W/SPECT&CT: CPT

## 2025-01-07 ENCOUNTER — RESULTS FOLLOW-UP (OUTPATIENT)
Dept: SURGICAL ONCOLOGY | Facility: CLINIC | Age: 60
End: 2025-01-07

## 2025-01-09 DIAGNOSIS — E21.3 HYPERPARATHYROIDISM (HCC): Primary | ICD-10-CM

## 2025-01-09 DIAGNOSIS — E83.52 HYPERCALCEMIA: ICD-10-CM

## 2025-01-10 ENCOUNTER — HOSPITAL ENCOUNTER (OUTPATIENT)
Dept: ULTRASOUND IMAGING | Facility: HOSPITAL | Age: 60
Discharge: HOME | End: 2025-01-10
Attending: SURGERY
Payer: COMMERCIAL

## 2025-01-10 DIAGNOSIS — E21.3 HYPERPARATHYROIDISM (HCC): ICD-10-CM

## 2025-01-10 DIAGNOSIS — E83.52 HYPERCALCEMIA: ICD-10-CM

## 2025-01-10 PROCEDURE — 76536 US EXAM OF HEAD AND NECK: CPT

## 2025-01-21 DIAGNOSIS — E83.52 HYPERCALCEMIA: Primary | ICD-10-CM

## 2025-01-21 DIAGNOSIS — E21.3 HYPERPARATHYROIDISM (HCC): ICD-10-CM

## 2025-01-28 ENCOUNTER — PATIENT MESSAGE (OUTPATIENT)
Dept: ENDOCRINOLOGY | Facility: CLINIC | Age: 60
End: 2025-01-28

## 2025-01-29 ENCOUNTER — TELEPHONE (OUTPATIENT)
Age: 60
End: 2025-01-29

## 2025-01-29 NOTE — PATIENT COMMUNICATION
Patient calling in asking asking for follow up on above message. She states it is not urgent but she would like clarification if it is ok to take due to her thyroid disease. Please advise, patient is requesting a call back.

## 2025-01-29 NOTE — TELEPHONE ENCOUNTER
Pt called in asking to speak with a nurse. Tried CTS.  Pt is asking if you can call her with her previous msg asap

## 2025-02-19 ENCOUNTER — APPOINTMENT (OUTPATIENT)
Dept: LAB | Facility: CLINIC | Age: 60
End: 2025-02-19
Payer: COMMERCIAL

## 2025-02-19 DIAGNOSIS — E61.1 IRON DEFICIENCY: ICD-10-CM

## 2025-02-19 DIAGNOSIS — E83.52 HYPERCALCEMIA: ICD-10-CM

## 2025-02-19 LAB — CA-I BLD-SCNC: 1.38 MMOL/L (ref 1.12–1.32)

## 2025-02-19 PROCEDURE — 82308 ASSAY OF CALCITONIN: CPT

## 2025-02-19 PROCEDURE — 82330 ASSAY OF CALCIUM: CPT

## 2025-02-19 PROCEDURE — 36415 COLL VENOUS BLD VENIPUNCTURE: CPT

## 2025-02-21 LAB — CALCIT SERPL-MCNC: <2 PG/ML (ref 0–5)

## 2025-02-26 ENCOUNTER — OFFICE VISIT (OUTPATIENT)
Dept: ENDOCRINOLOGY | Facility: CLINIC | Age: 60
End: 2025-02-26
Payer: COMMERCIAL

## 2025-02-26 VITALS
RESPIRATION RATE: 14 BRPM | OXYGEN SATURATION: 97 % | HEIGHT: 68 IN | WEIGHT: 138 LBS | SYSTOLIC BLOOD PRESSURE: 112 MMHG | DIASTOLIC BLOOD PRESSURE: 68 MMHG | HEART RATE: 59 BPM | TEMPERATURE: 97.9 F | BODY MASS INDEX: 20.92 KG/M2

## 2025-02-26 DIAGNOSIS — M85.80 OSTEOPENIA, UNSPECIFIED LOCATION: ICD-10-CM

## 2025-02-26 DIAGNOSIS — E03.8 OTHER SPECIFIED HYPOTHYROIDISM: ICD-10-CM

## 2025-02-26 DIAGNOSIS — E55.9 VITAMIN D DEFICIENCY: ICD-10-CM

## 2025-02-26 DIAGNOSIS — E83.52 HYPERCALCEMIA: ICD-10-CM

## 2025-02-26 DIAGNOSIS — E21.3 HYPERPARATHYROIDISM (HCC): Primary | ICD-10-CM

## 2025-02-26 PROCEDURE — 99214 OFFICE O/P EST MOD 30 MIN: CPT | Performed by: INTERNAL MEDICINE

## 2025-02-26 RX ORDER — LEVOTHYROXINE SODIUM 50 UG/1
50 TABLET ORAL DAILY
Qty: 90 TABLET | Refills: 1 | Status: SHIPPED | OUTPATIENT
Start: 2025-02-26

## 2025-02-26 NOTE — PROGRESS NOTES
"    Follow-up Patient Progress Note      CC: hyperparathyroidism     History of Present Illness:   58 yr female with Hx hypothyroidism diagnosed age 27(did not take meds until recently), menorrhagia s/p D&C 2/23, LMP 2022, hyperparathyroidism s/p parathyroidectomy 8/19/24 Rt inferior, hypercalcemia. Last visit was 11/4/24.    Since last visit, weight is same. She is regular with exercise.     She felt severe fatigue associated with sleep disturbance, agitation, mood disturbance, brain fog and polyuria. She had initial labs 3/2024 showing hypothyroidism, hypercalcemia dn iron deficiency. No Hx nephrolithiasis.     24 hr urine 5/6/2024 - calcium was 611mg/24 hrs with 3.5liters UOP.  7/28/24 PTH 66pg/mL, calcium 11.4mg/dL, Alb 4.7g/dL, fT4 0.9 ng/dL, TSH  3.5uIU/mL  7/25/24 US thyroid: 1.3cm*0.8cm*0.8cm Rt circumscribes mass inferior  5/31/2024 Nuclear sestamibi: Rt inferior parathyroid adenoma.  5/6/2024 DXA: osteopenia -  T scores -1.1 LS, -1.4 LTH, -1.7 LFN. 10 yr FRAX 0.7% hip and 6.8% major fracture.  8/19/2024 Surgical parathyroidectomy - Rt inferior parathyroid Dr. Richard Seymour    12/9/24 CT parathyroid:   - 0.4 cm candidate parathyroid adenoma in right tracheoesophageal groove posterior to right lower thyroid lobe  - 1.0 cm candidate parathyroid adenoma inferiorly adjacent to right lower thyroid lobe. Alternative consideration includes sequestered thyroid nodule.  1/3/25 NM parathyroid: did not isolate.      Physical Exam:  Body mass index is 20.98 kg/m².  /68 (BP Location: Left arm, Patient Position: Sitting)   Pulse 59   Temp 97.9 °F (36.6 °C) (Tympanic)   Resp 14   Ht 5' 8\" (1.727 m)   Wt 62.6 kg (138 lb)   SpO2 97%   BMI 20.98 kg/m²    Vitals:    02/26/25 1030   Weight: 62.6 kg (138 lb)        Physical Exam  Constitutional:       General: She is not in acute distress.     Appearance: She is well-developed.   HENT:      Head: Normocephalic and atraumatic.      Nose: Nose normal.   Eyes:      " "Conjunctiva/sclera: Conjunctivae normal.   Pulmonary:      Effort: Pulmonary effort is normal.   Abdominal:      General: There is no distension.   Musculoskeletal:      Cervical back: Normal range of motion and neck supple.   Skin:     Findings: No rash.      Comments: No icterus   Neurological:      Mental Status: She is alert and oriented to person, place, and time.         Labs:   No results found for: \"HGBA1C\"    Lab Results   Component Value Date    VDG5LELEMOHG 4.781 (H) 11/05/2024       Lab Results   Component Value Date    CREATININE 0.95 11/05/2024    BUN 17 11/05/2024    K 4.0 11/05/2024     11/05/2024    CO2 29 11/05/2024     eGFR   Date Value Ref Range Status   11/05/2024 67 ml/min/1.73sq m Final       Lab Results   Component Value Date    ALT 16 11/05/2024    AST 22 11/05/2024    ALKPHOS 81 11/05/2024       No results found for: \"CHOLESTEROL\"  No results found for: \"HDL\"  No results found for: \"TRIG\"  No results found for: \"NONHDLC\"      Assessment/Plan:    1. Hyperparathyroidism (HCC)  Assessment & Plan:  She had all data consistent with a Rt inferior parathyroid adenoma as culprit for hypercalcemia but did not have intraoperative PTH. She now has evidence of persistent Rt inferior parathyroid adenoma on CT parathyroid recently. She will need reassessment for surgery and may be exploration.    We do not have pathology from last surgery in [place].  She has persistent elevation in PTH and hypercalcemia suggesting either a duplicate rt inferior parathyroid adenoma or a failed resection last attempted surgery 8/19/24.    Follow up in 3 months.    2. Other specified hypothyroidism  -     levothyroxine (Synthroid) 50 mcg tablet; Take 1 tablet (50 mcg total) by mouth daily  3. Osteopenia, unspecified location  4. Hypercalcemia  5. Vitamin D deficiency        I have spent a total time of  minutes on 02/26/25 in caring for this patient including greater than 50% of this time was spent in " counseling/coordination of care as listed above.       Discussed with the patient and all questioned fully answered. She will contact me with concerns.    Kevin Ellis

## 2025-02-26 NOTE — ASSESSMENT & PLAN NOTE
She had all data consistent with a Rt inferior parathyroid adenoma as culprit for hypercalcemia but did not have intraoperative PTH. She now has evidence of persistent Rt inferior parathyroid adenoma on CT parathyroid recently. She will need reassessment for surgery and may be exploration.    We do not have pathology from last surgery in [place].  She has persistent elevation in PTH and hypercalcemia suggesting either a duplicate rt inferior parathyroid adenoma or a failed resection last attempted surgery 8/19/24.    Follow up in 3 months.

## 2025-03-13 PROBLEM — Z90.89 HISTORY OF PARATHYROIDECTOMY: Status: ACTIVE | Noted: 2025-03-13

## 2025-03-13 PROBLEM — Z98.890 HISTORY OF PARATHYROIDECTOMY: Status: ACTIVE | Noted: 2025-03-13

## 2025-03-17 ENCOUNTER — OFFICE VISIT (OUTPATIENT)
Dept: SURGICAL ONCOLOGY | Facility: CLINIC | Age: 60
End: 2025-03-17
Payer: COMMERCIAL

## 2025-03-17 ENCOUNTER — HOSPITAL ENCOUNTER (EMERGENCY)
Facility: HOSPITAL | Age: 60
Discharge: HOME | End: 2025-03-17
Attending: EMERGENCY MEDICINE | Admitting: EMERGENCY MEDICINE
Payer: COMMERCIAL

## 2025-03-17 VITALS
RESPIRATION RATE: 20 BRPM | HEART RATE: 74 BPM | TEMPERATURE: 97.5 F | OXYGEN SATURATION: 98 % | SYSTOLIC BLOOD PRESSURE: 122 MMHG | DIASTOLIC BLOOD PRESSURE: 78 MMHG

## 2025-03-17 VITALS
RESPIRATION RATE: 20 BRPM | HEIGHT: 68 IN | OXYGEN SATURATION: 98 % | TEMPERATURE: 97.5 F | HEART RATE: 74 BPM | BODY MASS INDEX: 21.52 KG/M2 | SYSTOLIC BLOOD PRESSURE: 122 MMHG | WEIGHT: 142 LBS | DIASTOLIC BLOOD PRESSURE: 78 MMHG

## 2025-03-17 DIAGNOSIS — H69.92 EUSTACHIAN TUBE DYSFUNCTION, LEFT: ICD-10-CM

## 2025-03-17 DIAGNOSIS — R05.9 COUGH: ICD-10-CM

## 2025-03-17 DIAGNOSIS — E21.3 HYPERPARATHYROIDISM (HCC): Primary | ICD-10-CM

## 2025-03-17 DIAGNOSIS — H92.02 EAR PAIN, LEFT: ICD-10-CM

## 2025-03-17 DIAGNOSIS — E83.52 HYPERCALCEMIA: ICD-10-CM

## 2025-03-17 DIAGNOSIS — Z90.89 HISTORY OF PARATHYROIDECTOMY: ICD-10-CM

## 2025-03-17 DIAGNOSIS — R09.81 NASAL CONGESTION: ICD-10-CM

## 2025-03-17 DIAGNOSIS — J34.89 SINUS PAIN: Primary | ICD-10-CM

## 2025-03-17 DIAGNOSIS — Z98.890 HISTORY OF PARATHYROIDECTOMY: ICD-10-CM

## 2025-03-17 PROCEDURE — 99284 EMERGENCY DEPT VISIT MOD MDM: CPT | Performed by: EMERGENCY MEDICINE

## 2025-03-17 PROCEDURE — 99214 OFFICE O/P EST MOD 30 MIN: CPT | Performed by: SURGERY

## 2025-03-17 PROCEDURE — 99282 EMERGENCY DEPT VISIT SF MDM: CPT

## 2025-03-17 RX ORDER — IBUPROFEN 600 MG/1
600 TABLET, FILM COATED ORAL ONCE
Status: COMPLETED | OUTPATIENT
Start: 2025-03-17 | End: 2025-03-17

## 2025-03-17 RX ORDER — TRAMADOL HYDROCHLORIDE 50 MG/1
50 TABLET ORAL EVERY 6 HOURS PRN
Qty: 10 TABLET | Refills: 0 | Status: SHIPPED | OUTPATIENT
Start: 2025-03-17

## 2025-03-17 RX ORDER — ACETAMINOPHEN 325 MG/1
975 TABLET ORAL ONCE
Status: COMPLETED | OUTPATIENT
Start: 2025-03-17 | End: 2025-03-17

## 2025-03-17 RX ADMIN — IBUPROFEN 600 MG: 600 TABLET, FILM COATED ORAL at 06:38

## 2025-03-17 RX ADMIN — ACETAMINOPHEN 975 MG: 325 TABLET, FILM COATED ORAL at 06:38

## 2025-03-17 NOTE — PROGRESS NOTES
Surgical Oncology Consult                                        [facility] [place]  [address] [facility] [address]  [phone]     Shawna Marrufo  1965  [id number]  [facility] [place]  [address] [facility] [address]  [phone]          Chief Complaint   Patient presents with    Consult       Patient being seen for consult for PTH 56.3, Ca 11.1. CT para 12/9/2024- poss right.         Assessment/Plan:     No problem-specific Assessment & Plan notes found for this encounter.        Assessment  Diagnoses and all orders for this visit:     Hyperparathyroidism (HCC)     Hypercalcemia     Primary hyperparathyroidism (HCC)  -     Ambulatory referral to Surgical Oncology        Advance Care Planning/Advance Directives:  Discussed disease status, cancer treatment plans and/or cancer treatment goals with the patient.          Oncology History     No history exists.         History of Present Illness: 59-year-old woman recently moved here from [place] where she underwent parathyroidectomy in August of this year.  She has had persistent symptoms as well as persistent hypercalcemia.  She underwent CAT scan here which was suggestive of a right-sided target.  She has been referred for evaluation and treatment.  No history of kidney stones.  However she has issues with achiness, fatigue, constipation, and foggy thinking.  She states that she has a general sense of malaise.  No personal or family history of endocrine malignancies.    She is here for preop and consenting for parathyroidectomy.     Review of Systems   Constitutional:  Positive for fatigue.   Eyes: Negative.    Respiratory: Negative.     Cardiovascular: Negative.    Gastrointestinal:  Positive for constipation.   Genitourinary: Negative.    Skin: Negative.    Allergic/Immunologic: Negative.     Hematological: Negative.    Psychiatric/Behavioral:          Difficulty sleeping.  Foggy thinking.            Problem List       Patient Active Problem List   Diagnosis    Hyperparathyroidism (HCC)    Hypercalcemia    Other specified hypothyroidism    Iron deficiency    Osteopenia    Vitamin D deficiency         Medical History        Past Medical History:   Diagnosis Date    Disease of thyroid gland      Headache(784.0)           Surgical History         Past Surgical History:   Procedure Laterality Date    KNEE SURGERY        PARATHYROID GLAND SURGERY             Family History         Family History   Problem Relation Age of Onset    Stroke Mother           She is 90. Stroke occurred 1 year ago.    Dementia Father      Coronary artery disease Brother      Heart disease Brother      Thyroid disease Sister           Social History               Socioeconomic History    Marital status: Single       Spouse name: Not on file    Number of children: Not on file    Years of education: Not on file    Highest education level: Not on file   Occupational History    Not on file   Tobacco Use    Smoking status: Never    Smokeless tobacco: Never   Vaping Use    Vaping status: Never Used   Substance and Sexual Activity    Alcohol use: Yes       Alcohol/week: 3.0 standard drinks of alcohol       Types: 3 Glasses of wine per week    Drug use: Never    Sexual activity: Not Currently       Partners: Male       Birth control/protection: Post-menopausal   Other Topics Concern    Not on file   Social History Narrative     Single     Previous work - in HR in [place]     Working - [employer]      Social Drivers of Health      Financial Resource Strain: Not on file   Food Insecurity: Not on file   Transportation Needs: Not on file   Physical Activity: Not on file   Stress: Not on file   Social Connections: Not on file   Intimate Partner Violence: Not on file   Housing Stability: Not on file           Current Medications      Current  "Outpatient Medications:     Cholecalciferol (Vitamin D3) 50 MCG (2000 UT) capsule, Take 1 capsule (2,000 Units total) by mouth daily, Disp: , Rfl:     levothyroxine (Synthroid) 50 mcg tablet, Take 1 tablet (50 mcg total) by mouth daily, Disp: 90 tablet, Rfl: 0    Omega-3 Fatty Acids (Fish Oil) 1200 MG CAPS, Take 1 capsule (1,200 mg total) by mouth Daily at 2am, Disp: , Rfl:           Allergies   Allergen Reactions    Sulfa Antibiotics Rash          Vitals:   /78 (BP Location: Left arm, Patient Position: Sitting, Cuff Size: Standard)   Pulse 74   Temp 97.5 °F (36.4 °C) (Temporal)   Resp 20   Ht 5' 8\" (1.727 m)   Wt 64.4 kg (142 lb)   SpO2 98%   BMI 21.59 kg/m²     Physical Exam  Vitals reviewed.   Constitutional:       Appearance: Normal appearance.   HENT:      Head: Normocephalic and atraumatic.      Right Ear: External ear normal.      Left Ear: External ear normal.   Eyes:      Extraocular Movements: Extraocular movements intact.      Pupils: Pupils are equal, round, and reactive to light.   Cardiovascular:      Rate and Rhythm: Normal rate and regular rhythm.      Pulses: Normal pulses.      Heart sounds: Normal heart sounds.   Pulmonary:      Effort: Pulmonary effort is normal.      Breath sounds: Normal breath sounds.   Abdominal:      General: Abdomen is flat.      Palpations: Abdomen is soft.   Genitourinary:     General: Normal vulva.      Rectum: Normal.   Musculoskeletal:         General: No tenderness. Normal range of motion.      Cervical back: Normal range of motion and neck supple.   Skin:     General: Skin is warm and dry.   Neurological:      General: No focal deficit present.      Mental Status: She is alert and oriented to person, place, and time.   Psychiatric:         Mood and Affect: Mood normal.         Thought Content: Thought content normal.            Pathology:        Labs:        Lab Results   Component Value Date     PTH 56.3 11/05/2024     CALCIUM 11.1 (H) 11/05/2024     " PHOS 3.4 11/05/2024         Lab Results   Component Value Date    PTH 56.3 11/05/2024    CALCIUM 11.1 (H) 11/05/2024    PHOS 3.4 11/05/2024          Imaging  CT parathyroid study w wo contrast  Result Date: 12/10/2024  Narrative: CTA  NECK  WITHOUT AND WITH CONTRAST FROM KERA TO SKULL BASE INDICATION: Hyperparathyroidism. primary hyperparathyroidism COMPARISON:  None. TECHNIQUE:  Both noncontrast, contrast, and post contrast delayed images were performed according to standard parathyroid protocol (4D technique) Coronal and sagittal reconstructions were performed. 3D reconstructions were performed on an independent workstation, and are supplied for review.  This examination, like all CT scans performed in the [facility], was performed utilizing techniques to minimize radiation dose exposure, including the use of iterative reconstruction and automated exposure control.  Rad dose 1355.51 mGy-cm IV Contrast:  75 mL of iohexol (OMNIPAQUE) FINDINGS: SERIAL NONCONTRAST, POST CONTRAST AND DELAYS: 0.4 cm enhancing nodule in right tracheoesophageal groove posteriorly adjacent to right lower thyroid lobe (302:160). 1.0 cm heterogeneously enhancing nodule with internal cystic change and tiny calcification inferiorly adjacent to right lower thyroid lobe (302:147). VASCULAR STRUCTURES:  Normal enhancement of the cervical vasculature. Left ICA cervical loop and tortuous right ICA distal cervical segment. VISUALIZED BRAIN PARENCHYMA: Not included in the field-of-view. VISUALIZED PARANASAL SINUSES:  Normal. NASAL CAVITY AND NASOPHARYNX: Normal. SUPRAHYOID NECK: Dental amalgam with extensive beam hardening artifact even with metal artifact reduction limits evaluation of anterior oral cavity for which direct visualization is recommended. Small circumscribed hyperdensity in right buccal mucosa (301:292), likely ingested material. Otherwise, normal visualized oral cavity within limitations. Normal oropharynx,  parapharyngeal and retropharyngeal spaces. INFRAHYOID NECK: Larynx and hypopharynx are normal. Glottic and subglottic airway are normal. THYROID GLAND:   Heterogeneously enhancing thyroid gland with bilateral subcentimeter nodules. Incidental discovery of one or more thyroid nodule(s) measuring less than 1.5 cm and without suspicious features is noted in this patient who is above 35 years  old; according to guidelines published in the February 2015 white paper on incidental thyroid nodules in the Journal of the American College of Radiology (JACR), no further evaluation is recommended. LYMPH NODES:  No pathologic or enlarged adenopathy. MEDIASTINUM:  Unremarkable. BONY STRUCTURES: No acute fracture or destructive osseous lesion. Moderate multilevel degenerative changes of cervical spine, worse at C5-C6. LUNG APICES: Mild biapical fibrotic change. No focal consolidation in visualized upper lung zones. NASCET criteria was used to determine the degree of internal carotid artery diameter stenosis.      Impression: 0.4 cm candidate parathyroid adenoma in right tracheoesophageal groove posteriorly adjacent to right lower thyroid lobe 1.0 cm candidate parathyroid adenoma inferiorly adjacent to right lower thyroid lobe. Alternative consideration includes sequestered thyroid nodule. Small circumscribed hyperdensity in right buccal mucosa, likely ingested material (i.e. gum or hard candy). Recommend direct visualization for further evaluation. Additional chronic/incidental findings as detailed above. The study was marked in EPIC for significant notification. Workstation performed: [id number]      XR spine lumbar minimum 4 views non injury  Result Date: 12/3/2024  Narrative: LUMBAR SPINE INDICATION:   Low back pain, unspecified. Other chronic pain. COMPARISON:  None. VIEWS:  XR SPINE LUMBAR MINIMUM 4 VIEWS NON INJURY FINDINGS: There are 5 non rib bearing lumbar vertebral bodies. There is no destructive osseous lesion. Alignment  is unremarkable. Mild disc space narrowing at L4-L5. The pedicles appear intact. Soft tissues are unremarkable.      Impression: Mild disc space narrowing at L4-L5. Electronically signed: 12/03/2024 09:38 AM Velasquez Ruiz MD     XR hip/pelv 2-3 vws right if performed  Result Date: 12/3/2024  Narrative: RIGHT HIP INDICATION:   Pain in right hip. COMPARISON:  None. VIEWS:  XR HIP/PELV 2-3 VWS RIGHT W PELVIS IF PERFORMED FINDINGS: There is no acute displaced fracture or dislocation. Slight right coxa profunda. Minimal degenerative changes right hip. No lytic or blastic osseous lesion. Soft tissues are unremarkable. The visualized lumbar spine is unremarkable.      Impression: Slight right coxa profunda. Minimal degenerative changes right hip. Electronically signed: 12/03/2024 09:37 AM Velasquez Ruiz MD     XR sacroiliac joints < 3 views  Result Date: 12/3/2024  Narrative: SACROILIAC JOINTS INDICATION:   Low back pain, unspecified. Other chronic pain. COMPARISON:  None. VIEWS:  XR SACROILIAC JOINTS < 3 VIEWS FINDINGS: The SI joints appear symmetric without focal erosions or joint space widening. Sacral arcuate lines appear intact. No fracture or pathologic bone lesions seen. Included portions of the pelvis and lumbar spine are unremarkable.      Impression: Unremarkable SI joints. Electronically signed: 12/03/2024 09:36 AM Velasquez Ruiz MD       Result Text   PARATHYROID SCAN     INDICATION:  E21.3: Hyperparathyroidism, unspecified     COMPARISON: CT parathyroid scan dated 12/9/2024     TECHNIQUE:   Following the intravenous administration of 27.0 mCi Tc-99m Cardiolite, anterior and bilateral anterior oblique projection images of the neck and mediastinum were obtained at approximately 10 minutes post injection followed at 2 hours post   injection by static anterior and bilateral oblique projections as well as SPECT CT images reconstructed in the coronal, sagittal and axial projections.     FINDINGS:     Immediate: Immediate  images demonstrate homogenous uptake of the radiotracer by the thyroid.     Delayed: Delayed images demonstrate normal washout of the radiotracer from the thyroid. There are no persistent foci of activity that would be suggestive of a parathyroid adenoma. Specifically, there is no persistent focal tracer activity about the right   thyroid lobe in region of small enhancing nodules seen on recent CT parathyroid scan.     Low-dose unenhanced CT performed for localization purposes demonstrates:  -Emphysematous changes     IMPRESSION:     1. No scintigraphic evidence of a parathyroid adenoma.     Specifically, there is no definitive focal tracer activity on delayed imaging in the region of small enhancing nodules seen about the right thyroid lobe on CT parathyroid scan dated 12/9/2024.        Workstation performed: [id number]        I reviewed the above laboratory and imaging data.     Discussion/Summary: 59-year-old woman, primary hyperparathyroidism.  Right-sided target noted on CAT scan.  Prior op report suggests removal of 2 parathyroid glands on the right however.  She would be amenable to reexploration to address what appears to be residual disease.  Risk and benefits of procedure include infection, bleeding, nerve injury, need for possible additional surgery discussed.  Will therefore plan on right parathyroid reexploration with flexible indirect laryngoscopy, possible 4-gland exploration with intraoperative PTH monitoring.

## 2025-03-17 NOTE — ED ATTENDING ATTESTATION
3/17/2025  I, Darius Hill MD, saw and evaluated the patient. I have discussed the patient with the resident/non-physician practitioner and agree with the resident's/non-physician practitioner's findings, Plan of Care, and MDM as documented in the resident's/non-physician practitioner's note, except where noted. All available labs and Radiology studies were reviewed.  I was present for key portions of any procedure(s) performed by the resident/non-physician practitioner and I was immediately available to provide assistance.       At this point I agree with the current assessment done in the Emergency Department.  I have conducted an independent evaluation of this patient a history and physical is as follows:    ED Course     59-year-old female presenting to the emergency department for evaluation of left ear pain and left-sided facial pain.  Patient reports that over the past 2 months she has had significant nasal congestion.  The patient states that sometimes her nose drains clear, sometimes yellow, sometimes green, occasionally bloody or brown.  She denies any fever during this period of time.  Patient has had intermittent cough.  Patient states that 2 days ago she was using a nasal saline rinse, and after rinsing her nose blew her nose forcefully and had acute onset pain in the left ear.  Patient denies any change in hearing.  Patient was going to an outpatient surgical oncology office visit today and thought that she would stop in the emergency department on the way to the visit for evaluation.  In addition to the left ear pain, the patient is noted to have some pain in the left lateral side of the face, extending from the temporalis down through the left lower jaw laterally.    On examination head is unremarkable in external appearance.  Patient is nontender to palpation over the temporal artery.  External ears are normal bilaterally.  Tympanic membranes are normal bilaterally.  There is no effusion  bilaterally.  Hearing is intact bilaterally.  Patient is noted to have nasal secretions bilaterally with some injected nasal mucosa.  No intraoral pathology.  The patient is noted to have some mild posterior pharyngeal erythema on the left.  No cervical lymphadenopathy.  Lungs are clear bilaterally.  Heart is regular rate and rhythm with no murmurs rubs or gallops.    MEDICAL DECISION MAKING    Number and Complexity of Problems  Differential diagnosis: Likely eustachian tube dysfunction.  No evidence of temporal arteritis.  No bacterial pharyngitis findings.    Medical Decision Making Data      Treatment and Disposition  ED course: Counseled patient on using Afrin nasal spray for 3 days.  I also suggested that she should use Flonase, however patient has had adverse effects from Flonase in the past.  Patient will be referred to ENT for further evaluation and management of her symptoms should they continue.  Shared decision making: Patient agreeable with plan.  Code status: Full code.              Critical Care Time  Procedures

## 2025-03-17 NOTE — DISCHARGE INSTRUCTIONS
Please take Afrin, 2 sprays in each nostril twice daily for the next 3 days.  You may take Tylenol and ibuprofen for your symptoms as well.  Please follow-up with ENT for further management.  Please return to the ED if any concerning symptoms present.  Patient Education     Ear Pain ED   General Information   You came to the Emergency Department (ED) for ear pain. Many different things can cause ear pain.  What care is needed at home?   Call your regular doctor to let them know you were in the ED. Make a follow-up appointment if you were told to.  You may want to take drugs like ibuprofen, naproxen, or acetaminophen to help with pain.  If it helps you feel better, you can hold a cool, wet washcloth on the outside of your ear.  When do I need to get emergency help?   Return to the ED if:   Your face looks uneven or droops on 1 side.  You are very confused or cannot function normally.  You have trouble walking or are unsteady.  You are too weak to stand.  When do I need to call the doctor?   You have a fever of 100.4°F (38°C) or higher, or chills.  Your pain gets worse.  You have redness behind your ear (this can be harder to see on dark skin).  You have swelling behind your ear.  Your outer ear is painful and swollen.  You have blisters inside your ear.  You have fluid, pus, or blood draining from your ear  You have new or worsening symptoms.  Last Reviewed Date   2023-05-02  Consumer Information Use and Disclaimer   This generalized information is a limited summary of diagnosis, treatment, and/or medication information. It is not meant to be comprehensive and should be used as a tool to help the user understand and/or assess potential diagnostic and treatment options. It does NOT include all information about conditions, treatments, medications, side effects, or risks that may apply to a specific patient. It is not intended to be medical advice or a substitute for the medical advice, diagnosis, or treatment of a  health care provider based on the health care provider's examination and assessment of a patient’s specific and unique circumstances. Patients must speak with a health care provider for complete information about their health, medical questions, and treatment options, including any risks or benefits regarding use of medications. This information does not endorse any treatments or medications as safe, effective, or approved for treating a specific patient. [employer], Inc[employer] and its affiliates disclaim any warranty or liability relating to this information or the use thereof. The use of this information is governed by the Terms of Use, available at https://www.[employer].com/en/know/clinical-effectiveness-terms   Copyright   Copyright © 2024 [employer], Inc[employer] and its affiliates and/or licensors. All rights reserved.

## 2025-03-17 NOTE — ED PROVIDER NOTES
"Time reflects when diagnosis was documented in both MDM as applicable and the Disposition within this note       Time User Action Codes Description Comment    3/17/2025  6:23 AM Ayden Marmolejo [J34.89] Sinus pain     3/17/2025  6:23 AM Ayden Marmolejo [R05.9] Cough     3/17/2025  6:23 AM Ayden Marmolejo [R09.81] Nasal congestion     3/17/2025  6:24 AM Ayden Marmolejo [H92.02] Ear pain, left     3/17/2025  6:51 AM Ayden Marmolejo [H69.92] Eustachian tube dysfunction, left           ED Disposition       ED Disposition   Discharge    Condition   Stable    Date/Time   Mon Mar 17, 2025  6:40 AM    Comment   Shawna Marrufo discharge to home/self care.                   Assessment & Plan       Medical Decision Making  Risk  OTC drugs.  Prescription drug management.      Patient is a 59 y.o. female with a past medical history of parathyroid adenoma, hypothyroidism, and hypercalcemia presenting for worsening sinus pain, left ear pain, and congestion.      Vital signs stable. On exam left sided cervical lymph node pain.    History and physical exam most consistent with left-sided eustachian tube dysfunction secondary to treatment of sinus pain.    Plan: Tylenol, ibuprofen, ENT referral    View ED course for further discussion on patient workup.    Upon re-evaluation patient is resting comfortably in no acute distress.  Patient is agreeable to discharge with ENT follow-up and use of Afrin for symptoms.  Patient also instructed to use Tylenol and ibuprofen.    Disposition: Discharged with ED return precautions.    Portions of the record may have been created with voice recognition software. Occasional wrong word or \"sound a like\" substitutions may have occurred due to the inherent limitations of voice recognition software. Read the chart carefully and recognize, using context, where substitutions have occurred.         Medications   acetaminophen (TYLENOL) tablet 975 mg (975 mg Oral Given 3/17/25 0638)   ibuprofen " "(MOTRIN) tablet 600 mg (600 mg Oral Given 3/17/25 0638)       ED Risk Strat Scores                                                History of Present Illness       Chief Complaint   Patient presents with    Sinus Problem     Pt states she has been having sinus congestion and recently did a nasal rinse and then to blew her nose which caused her to hear a popping noise. Pt states \"I think I burst my ear drum.\"        Past Medical History:   Diagnosis Date    Disease of thyroid gland     Headache(784.0)       Past Surgical History:   Procedure Laterality Date    KNEE SURGERY      PARATHYROID GLAND SURGERY        Family History   Problem Relation Age of Onset    Stroke Mother         She is 90. Stroke occurred 1 year ago.    Dementia Father     Coronary artery disease Brother     Heart disease Brother     Thyroid disease Sister       Social History     Tobacco Use    Smoking status: Never    Smokeless tobacco: Never   Vaping Use    Vaping status: Never Used   Substance Use Topics    Alcohol use: Yes     Alcohol/week: 3.0 standard drinks of alcohol     Types: 3 Glasses of wine per week    Drug use: Never      E-Cigarette/Vaping    E-Cigarette Use Never User       E-Cigarette/Vaping Substances    Nicotine No     THC No     CBD No     Flavoring No     Other No     Unknown No       I have reviewed and agree with the history as documented.       Sinus Problem  Associated symptoms: congestion and ear pain      Patient is a 59-year-old female with a past medical history of parathyroid adenoma, hypothyroidism, and hypercalcemia presenting for worsening sinus pain, left ear pain, and congestion.  Patient states that she has been battling worsening congestion over the last few months but most recently 2 days ago, she was doing a saline rinse and felt a pop on the left side of her face and is complaining of worsening pain on the left side of her face.  Patient has not taken any medicine for this.  Patient denies fevers, chills, " nausea, vomiting, and generalized bodyaches.    Review of Systems   HENT:  Positive for congestion, ear pain and sinus pain.    All other systems reviewed and are negative.          Objective       ED Triage Vitals   Temperature Pulse Blood Pressure Respirations SpO2 Patient Position - Orthostatic VS   03/17/25 0539 03/17/25 0539 03/17/25 0539 03/17/25 0539 03/17/25 0539 03/17/25 0539   97.5 °F (36.4 °C) 74 122/78 20 98 % Sitting      Temp Source Heart Rate Source BP Location FiO2 (%) Pain Score    03/17/25 0539 -- 03/17/25 0539 -- 03/17/25 0638    Tympanic  Right arm  4      Vitals      Date and Time Temp Pulse SpO2 Resp BP Pain Score FACES Pain Rating User   03/17/25 0638 -- -- -- -- -- 4 -- AM   03/17/25 0539 97.5 °F (36.4 °C) 74 98 % 20 122/78 -- -- RG            Physical Exam  Vitals and nursing note reviewed.   Constitutional:       General: She is not in acute distress.     Appearance: Normal appearance. She is not ill-appearing, toxic-appearing or diaphoretic.   HENT:      Head: Normocephalic and atraumatic.      Right Ear: Tympanic membrane, ear canal and external ear normal. There is no impacted cerumen. No hemotympanum. Tympanic membrane is not perforated or erythematous.      Left Ear: Tympanic membrane, ear canal and external ear normal. There is no impacted cerumen. No hemotympanum. Tympanic membrane is not perforated or erythematous.   Eyes:      General: No scleral icterus.        Right eye: No discharge.         Left eye: No discharge.      Extraocular Movements: Extraocular movements intact.      Conjunctiva/sclera: Conjunctivae normal.      Pupils: Pupils are equal, round, and reactive to light.   Cardiovascular:      Rate and Rhythm: Normal rate and regular rhythm.      Pulses: Normal pulses.      Heart sounds: Normal heart sounds. No murmur heard.  Pulmonary:      Effort: Pulmonary effort is normal. No respiratory distress.      Breath sounds: Normal breath sounds. No stridor. No wheezing,  rhonchi or rales.   Abdominal:      General: Bowel sounds are normal. There is no distension.      Palpations: Abdomen is soft.      Tenderness: There is no abdominal tenderness. There is no right CVA tenderness, left CVA tenderness, guarding or rebound. Negative signs include Urban's sign and McBurney's sign.   Musculoskeletal:         General: Normal range of motion.      Cervical back: Normal range of motion and neck supple. No tenderness.      Right lower leg: No edema.      Left lower leg: No edema.   Lymphadenopathy:      Cervical: Cervical adenopathy (Left) present.   Skin:     General: Skin is warm and dry.      Coloration: Skin is not jaundiced.      Findings: No erythema.   Neurological:      General: No focal deficit present.      Mental Status: She is alert and oriented to person, place, and time.      Cranial Nerves: Cranial nerves 2-12 are intact. No cranial nerve deficit.      Sensory: Sensation is intact. No sensory deficit.      Motor: Motor function is intact. No weakness.   Psychiatric:         Mood and Affect: Mood normal.         Behavior: Behavior normal.         Thought Content: Thought content normal.         Judgment: Judgment normal.         Results Reviewed       None            No orders to display       Procedures    ED Medication and Procedure Management   Prior to Admission Medications   Prescriptions Last Dose Informant Patient Reported? Taking?   Cholecalciferol (Vitamin D3) 50 MCG (2000 UT) capsule  Self No No   Sig: Take 1 capsule (2,000 Units total) by mouth daily   Omega-3 Fatty Acids (Fish Oil) 1200 MG CAPS  Self No No   Sig: Take 1 capsule (1,200 mg total) by mouth Daily at 2am   levothyroxine (Synthroid) 50 mcg tablet   No No   Sig: Take 1 tablet (50 mcg total) by mouth daily      Facility-Administered Medications: None     Discharge Medication List as of 3/17/2025  6:41 AM        CONTINUE these medications which have NOT CHANGED    Details   Cholecalciferol (Vitamin D3) 50  MCG (2000 UT) capsule Take 1 capsule (2,000 Units total) by mouth daily, Starting Fri 11/22/2024, No Print      levothyroxine (Synthroid) 50 mcg tablet Take 1 tablet (50 mcg total) by mouth daily, Starting Wed 2/26/2025, Normal      Omega-3 Fatty Acids (Fish Oil) 1200 MG CAPS Take 1 capsule (1,200 mg total) by mouth Daily at 2am, Starting Fri 11/22/2024, No Print             ED SEPSIS DOCUMENTATION   Time reflects when diagnosis was documented in both MDM as applicable and the Disposition within this note       Time User Action Codes Description Comment    3/17/2025  6:23 AM Ayden Marmolejo [J34.89] Sinus pain     3/17/2025  6:23 AM Ayden Marmolejo [R05.9] Cough     3/17/2025  6:23 AM Ayden Marmolejo [R09.81] Nasal congestion     3/17/2025  6:24 AM Ayden Marmolejo [H92.02] Ear pain, left     3/17/2025  6:51 AM Ayden Marmolejo [H69.92] Eustachian tube dysfunction, left                  Ayden Marmolejo, DO  03/17/25 2224

## 2025-03-25 ENCOUNTER — APPOINTMENT (OUTPATIENT)
Dept: LAB | Facility: CLINIC | Age: 60
End: 2025-03-25
Payer: COMMERCIAL

## 2025-03-25 ENCOUNTER — HOSPITAL ENCOUNTER (OUTPATIENT)
Dept: RADIOLOGY | Facility: HOSPITAL | Age: 60
Discharge: HOME | End: 2025-03-25
Payer: COMMERCIAL

## 2025-03-25 DIAGNOSIS — E21.3 HYPERPARATHYROIDISM (HCC): ICD-10-CM

## 2025-03-25 LAB
ALBUMIN SERPL BCG-MCNC: 4.3 G/DL (ref 3.5–5)
ALP SERPL-CCNC: 88 U/L (ref 34–104)
ALT SERPL W P-5'-P-CCNC: 20 U/L (ref 7–52)
ANION GAP SERPL CALCULATED.3IONS-SCNC: 5 MMOL/L (ref 4–13)
AST SERPL W P-5'-P-CCNC: 26 U/L (ref 13–39)
ATRIAL RATE: 62 BPM
BASOPHILS # BLD MANUAL: 0.04 THOUSAND/UL (ref 0–0.1)
BASOPHILS NFR MAR MANUAL: 1 % (ref 0–1)
BILIRUB SERPL-MCNC: 0.36 MG/DL (ref 0.2–1)
BUN SERPL-MCNC: 23 MG/DL (ref 5–25)
CALCIUM SERPL-MCNC: 10.6 MG/DL (ref 8.4–10.2)
CHLORIDE SERPL-SCNC: 105 MMOL/L (ref 96–108)
CHOLEST SERPL-MCNC: 171 MG/DL (ref ?–200)
CO2 SERPL-SCNC: 29 MMOL/L (ref 21–32)
CREAT SERPL-MCNC: 0.95 MG/DL (ref 0.6–1.3)
EOSINOPHIL # BLD MANUAL: 0 THOUSAND/UL (ref 0–0.4)
EOSINOPHIL NFR BLD MANUAL: 0 % (ref 0–6)
ERYTHROCYTE [DISTWIDTH] IN BLOOD BY AUTOMATED COUNT: 12.9 % (ref 11.6–15.1)
FERRITIN SERPL-MCNC: 105 NG/ML (ref 11–307)
GFR SERPL CREATININE-BSD FRML MDRD: 66 ML/MIN/1.73SQ M
GLUCOSE P FAST SERPL-MCNC: 88 MG/DL (ref 65–99)
HCT VFR BLD AUTO: 37.5 % (ref 34.8–46.1)
HDLC SERPL-MCNC: 55 MG/DL
HGB BLD-MCNC: 12.8 G/DL (ref 11.5–15.4)
IRON SATN MFR SERPL: 30 % (ref 15–50)
IRON SERPL-MCNC: 96 UG/DL (ref 50–212)
LDLC SERPL CALC-MCNC: 97 MG/DL (ref 0–100)
LYMPHOCYTES # BLD AUTO: 2.01 THOUSAND/UL (ref 0.6–4.47)
LYMPHOCYTES # BLD AUTO: 49 % (ref 14–44)
MCH RBC QN AUTO: 31.2 PG (ref 26.8–34.3)
MCHC RBC AUTO-ENTMCNC: 34.1 G/DL (ref 31.4–37.4)
MCV RBC AUTO: 92 FL (ref 82–98)
MONOCYTES # BLD AUTO: 0.45 THOUSAND/UL (ref 0–1.22)
MONOCYTES NFR BLD: 11 % (ref 4–12)
NEUTROPHILS # BLD MANUAL: 1.6 THOUSAND/UL (ref 1.85–7.62)
NEUTS SEG NFR BLD AUTO: 39 % (ref 43–75)
NONHDLC SERPL-MCNC: 116 MG/DL
P AXIS: 80 DEGREES
PLATELET # BLD AUTO: 230 THOUSANDS/UL (ref 149–390)
PLATELET BLD QL SMEAR: ADEQUATE
PMV BLD AUTO: 9.2 FL (ref 8.9–12.7)
POTASSIUM SERPL-SCNC: 4 MMOL/L (ref 3.5–5.3)
PR INTERVAL: 162 MS
PROT SERPL-MCNC: 7.5 G/DL (ref 6.4–8.4)
QRS AXIS: 92 DEGREES
QRSD INTERVAL: 76 MS
QT INTERVAL: 416 MS
QTC INTERVAL: 423 MS
RBC # BLD AUTO: 4.1 MILLION/UL (ref 3.81–5.12)
RBC MORPH BLD: NORMAL
SODIUM SERPL-SCNC: 139 MMOL/L (ref 135–147)
T WAVE AXIS: 56 DEGREES
TIBC SERPL-MCNC: 315 UG/DL (ref 250–450)
TRANSFERRIN SERPL-MCNC: 225 MG/DL (ref 203–362)
TRIGL SERPL-MCNC: 96 MG/DL (ref ?–150)
UIBC SERPL-MCNC: 219 UG/DL (ref 155–355)
VENTRICULAR RATE: 62 BPM
WBC # BLD AUTO: 4.1 THOUSAND/UL (ref 4.31–10.16)

## 2025-03-25 PROCEDURE — 82728 ASSAY OF FERRITIN: CPT

## 2025-03-25 PROCEDURE — 83550 IRON BINDING TEST: CPT

## 2025-03-25 PROCEDURE — 71046 X-RAY EXAM CHEST 2 VIEWS: CPT

## 2025-03-25 PROCEDURE — 83540 ASSAY OF IRON: CPT

## 2025-03-25 PROCEDURE — 85007 BL SMEAR W/DIFF WBC COUNT: CPT

## 2025-03-25 PROCEDURE — 93010 ELECTROCARDIOGRAM REPORT: CPT | Performed by: INTERNAL MEDICINE

## 2025-03-25 PROCEDURE — 80053 COMPREHEN METABOLIC PANEL: CPT

## 2025-03-25 PROCEDURE — 36415 COLL VENOUS BLD VENIPUNCTURE: CPT

## 2025-03-25 PROCEDURE — 85027 COMPLETE CBC AUTOMATED: CPT

## 2025-03-26 ENCOUNTER — RESULTS FOLLOW-UP (OUTPATIENT)
Dept: INTERNAL MEDICINE CLINIC | Facility: CLINIC | Age: 60
End: 2025-03-26

## 2025-04-08 NOTE — PRE-PROCEDURE INSTRUCTIONS
Pre-Surgery Instructions:   Medication Instructions    Cholecalciferol (Vitamin D3) 50 MCG (2000 UT) capsule Stop taking 7 days prior to surgery.    levothyroxine (Synthroid) 50 mcg tablet Take day of surgery.    Omega-3 Fatty Acids (Fish Oil) 1200 MG CAPS Stop taking 7 days prior to surgery.    Medication instructions for day of surgery reviewed. Please take all instructed medications with only a sip of water.       You will receive a call one business day prior to surgery with an arrival time and hospital directions. If your surgery is scheduled on a Monday, the hospital will be calling you on the Friday prior to your surgery. If you have not heard from anyone by 8pm, please call the hospital supervisor through the hospital  at [phone]. ([place] [phone] or [place] [phone]).    Do not eat or drink anything after midnight the night before your surgery, including candy, mints, lifesavers, or chewing gum. Do not drink alcohol 24hrs before your surgery. Try not to smoke at least 24hrs before your surgery.       Follow the pre surgery showering instructions as listed in the “My Surgical Experience Booklet” or otherwise provided by your surgeon's office. Do not use a blade to shave the surgical area 1 week before surgery. It is okay to use a clean electric clippers up to 24 hours before surgery. Do not apply any lotions, creams, including makeup, cologne, deodorant, or perfumes after showering on the day of your surgery. Do not use dry shampoo, hair spray, hair gel, or any type of hair products.     No contact lenses, eye make-up, or artificial eyelashes. Remove nail polish, including gel polish, and any artificial, gel, or acrylic nails if possible. Remove all jewelry including rings and body piercing jewelry.     Wear causal clothing that is easy to take on and off. Consider your type of surgery.    Keep any valuables, jewelry, piercings at home. Please bring any specially ordered equipment  (sling, braces) if indicated.    Arrange for a responsible person to drive you to and from the hospital on the day of your surgery. Please confirm the visitor policy for the day of your procedure when you receive your phone call with an arrival time.     Call the surgeon's office with any new illnesses, exposures, or additional questions prior to surgery.    Please reference your “My Surgical Experience Booklet” for additional information to prepare for your upcoming surgery.

## 2025-04-09 ENCOUNTER — ANESTHESIA EVENT (OUTPATIENT)
Dept: PERIOP | Facility: HOSPITAL | Age: 60
End: 2025-04-09
Payer: COMMERCIAL

## 2025-04-17 ENCOUNTER — HOSPITAL ENCOUNTER (OUTPATIENT)
Facility: HOSPITAL | Age: 60
Setting detail: OUTPATIENT SURGERY
Discharge: HOME | End: 2025-04-17
Attending: SURGERY | Admitting: SURGERY
Payer: COMMERCIAL

## 2025-04-17 ENCOUNTER — ANESTHESIA (OUTPATIENT)
Dept: PERIOP | Facility: HOSPITAL | Age: 60
End: 2025-04-17
Payer: COMMERCIAL

## 2025-04-17 VITALS
BODY MASS INDEX: 20.46 KG/M2 | SYSTOLIC BLOOD PRESSURE: 130 MMHG | HEIGHT: 68 IN | RESPIRATION RATE: 18 BRPM | WEIGHT: 135 LBS | HEART RATE: 80 BPM | OXYGEN SATURATION: 96 % | TEMPERATURE: 96.8 F | DIASTOLIC BLOOD PRESSURE: 84 MMHG

## 2025-04-17 DIAGNOSIS — E83.52 HYPERCALCEMIA: ICD-10-CM

## 2025-04-17 DIAGNOSIS — E21.3 HYPERPARATHYROIDISM (HCC): ICD-10-CM

## 2025-04-17 DIAGNOSIS — Z98.890 HISTORY OF PARATHYROIDECTOMY: Primary | ICD-10-CM

## 2025-04-17 DIAGNOSIS — Z90.89 HISTORY OF PARATHYROIDECTOMY: Primary | ICD-10-CM

## 2025-04-17 LAB
CALCIUM SERPL-MCNC: 10.1 MG/DL (ref 8.4–10.2)
PTH-INTACT P EXCISION SERPL-MCNC: 156.3 PG/ML (ref 12–88)
PTH-INTACT P EXCISION SERPL-MCNC: 175.7 PG/ML (ref 12–88)
PTH-INTACT P EXCISION SERPL-MCNC: 22.8 PG/ML (ref 12–88)
PTH-INTACT P EXCISION SERPL-MCNC: 241.8 PG/ML (ref 12–88)
PTH-INTACT P EXCISION SERPL-MCNC: 34.4 PG/ML (ref 12–88)
PTH-INTACT P EXCISION SERPL-MCNC: 60.1 PG/ML (ref 12–88)
PTH-INTACT P EXCISION SERPL-MCNC: 63.6 PG/ML (ref 12–88)
PTH-INTACT P EXCISION SERPL-MCNC: 72.9 PG/ML (ref 12–88)
PTH-INTACT P EXCISION SERPL-MCNC: 80.2 PG/ML (ref 12–88)
PTH-INTACT P EXCISION SERPL-MCNC: 82.4 PG/ML (ref 12–88)
PTH-INTACT P EXCISION SERPL-MCNC: 85.5 PG/ML (ref 12–88)

## 2025-04-17 PROCEDURE — 83970 ASSAY OF PARATHORMONE: CPT | Performed by: SURGERY

## 2025-04-17 PROCEDURE — 31575 DIAGNOSTIC LARYNGOSCOPY: CPT | Performed by: SURGERY

## 2025-04-17 PROCEDURE — NC001 PR NO CHARGE: Performed by: SURGERY

## 2025-04-17 PROCEDURE — 88305 TISSUE EXAM BY PATHOLOGIST: CPT | Performed by: PATHOLOGY

## 2025-04-17 PROCEDURE — 88331 PATH CONSLTJ SURG 1 BLK 1SPC: CPT | Performed by: PATHOLOGY

## 2025-04-17 PROCEDURE — 60500 EXPLORE PARATHYROID GLANDS: CPT | Performed by: SURGERY

## 2025-04-17 RX ORDER — HYDROMORPHONE HCL IN WATER/PF 6 MG/30 ML
0.2 PATIENT CONTROLLED ANALGESIA SYRINGE INTRAVENOUS
Status: DISCONTINUED | OUTPATIENT
Start: 2025-04-17 | End: 2025-04-17 | Stop reason: HOSPADM

## 2025-04-17 RX ORDER — FENTANYL CITRATE 50 UG/ML
INJECTION, SOLUTION INTRAMUSCULAR; INTRAVENOUS AS NEEDED
Status: DISCONTINUED | OUTPATIENT
Start: 2025-04-17 | End: 2025-04-17

## 2025-04-17 RX ORDER — ONDANSETRON 2 MG/ML
INJECTION INTRAMUSCULAR; INTRAVENOUS AS NEEDED
Status: DISCONTINUED | OUTPATIENT
Start: 2025-04-17 | End: 2025-04-17

## 2025-04-17 RX ORDER — EPHEDRINE SULFATE 50 MG/ML
INJECTION INTRAVENOUS AS NEEDED
Status: DISCONTINUED | OUTPATIENT
Start: 2025-04-17 | End: 2025-04-17

## 2025-04-17 RX ORDER — SODIUM CHLORIDE 9 MG/ML
INJECTION, SOLUTION INTRAVENOUS CONTINUOUS PRN
Status: DISCONTINUED | OUTPATIENT
Start: 2025-04-17 | End: 2025-04-17

## 2025-04-17 RX ORDER — BUPIVACAINE HYDROCHLORIDE 2.5 MG/ML
INJECTION, SOLUTION EPIDURAL; INFILTRATION; INTRACAUDAL; PERINEURAL AS NEEDED
Status: DISCONTINUED | OUTPATIENT
Start: 2025-04-17 | End: 2025-04-17 | Stop reason: HOSPADM

## 2025-04-17 RX ORDER — MAGNESIUM HYDROXIDE 1200 MG/15ML
LIQUID ORAL AS NEEDED
Status: DISCONTINUED | OUTPATIENT
Start: 2025-04-17 | End: 2025-04-17 | Stop reason: HOSPADM

## 2025-04-17 RX ORDER — SODIUM CHLORIDE, SODIUM LACTATE, POTASSIUM CHLORIDE, CALCIUM CHLORIDE 600; 310; 30; 20 MG/100ML; MG/100ML; MG/100ML; MG/100ML
INJECTION, SOLUTION INTRAVENOUS CONTINUOUS PRN
Status: DISCONTINUED | OUTPATIENT
Start: 2025-04-17 | End: 2025-04-17

## 2025-04-17 RX ORDER — GLYCOPYRROLATE 0.2 MG/ML
INJECTION INTRAMUSCULAR; INTRAVENOUS AS NEEDED
Status: DISCONTINUED | OUTPATIENT
Start: 2025-04-17 | End: 2025-04-17

## 2025-04-17 RX ORDER — DEXAMETHASONE SODIUM PHOSPHATE 10 MG/ML
INJECTION, SOLUTION INTRAMUSCULAR; INTRAVENOUS AS NEEDED
Status: DISCONTINUED | OUTPATIENT
Start: 2025-04-17 | End: 2025-04-17

## 2025-04-17 RX ORDER — ACETAMINOPHEN 325 MG/1
975 TABLET ORAL EVERY 6 HOURS SCHEDULED
Status: DISCONTINUED | OUTPATIENT
Start: 2025-04-17 | End: 2025-04-17 | Stop reason: HOSPADM

## 2025-04-17 RX ORDER — SODIUM CHLORIDE, SODIUM LACTATE, POTASSIUM CHLORIDE, CALCIUM CHLORIDE 600; 310; 30; 20 MG/100ML; MG/100ML; MG/100ML; MG/100ML
100 INJECTION, SOLUTION INTRAVENOUS CONTINUOUS
Status: DISCONTINUED | OUTPATIENT
Start: 2025-04-17 | End: 2025-04-17 | Stop reason: HOSPADM

## 2025-04-17 RX ORDER — ROCURONIUM BROMIDE 10 MG/ML
INJECTION, SOLUTION INTRAVENOUS AS NEEDED
Status: DISCONTINUED | OUTPATIENT
Start: 2025-04-17 | End: 2025-04-17

## 2025-04-17 RX ORDER — PROPOFOL 10 MG/ML
INJECTION, EMULSION INTRAVENOUS AS NEEDED
Status: DISCONTINUED | OUTPATIENT
Start: 2025-04-17 | End: 2025-04-17

## 2025-04-17 RX ORDER — FENTANYL CITRATE/PF 50 MCG/ML
50 SYRINGE (ML) INJECTION
Status: DISCONTINUED | OUTPATIENT
Start: 2025-04-17 | End: 2025-04-17 | Stop reason: HOSPADM

## 2025-04-17 RX ORDER — LIDOCAINE HYDROCHLORIDE 10 MG/ML
INJECTION, SOLUTION EPIDURAL; INFILTRATION; INTRACAUDAL; PERINEURAL AS NEEDED
Status: DISCONTINUED | OUTPATIENT
Start: 2025-04-17 | End: 2025-04-17

## 2025-04-17 RX ORDER — METOCLOPRAMIDE HYDROCHLORIDE 5 MG/ML
10 INJECTION INTRAMUSCULAR; INTRAVENOUS ONCE AS NEEDED
Status: COMPLETED | OUTPATIENT
Start: 2025-04-17 | End: 2025-04-17

## 2025-04-17 RX ORDER — ONDANSETRON 2 MG/ML
4 INJECTION INTRAMUSCULAR; INTRAVENOUS ONCE AS NEEDED
Status: DISCONTINUED | OUTPATIENT
Start: 2025-04-17 | End: 2025-04-17 | Stop reason: HOSPADM

## 2025-04-17 RX ORDER — MIDAZOLAM HYDROCHLORIDE 2 MG/2ML
INJECTION, SOLUTION INTRAMUSCULAR; INTRAVENOUS AS NEEDED
Status: DISCONTINUED | OUTPATIENT
Start: 2025-04-17 | End: 2025-04-17

## 2025-04-17 RX ORDER — TRAMADOL HYDROCHLORIDE 50 MG/1
50 TABLET ORAL EVERY 6 HOURS PRN
Status: DISCONTINUED | OUTPATIENT
Start: 2025-04-17 | End: 2025-04-17 | Stop reason: HOSPADM

## 2025-04-17 RX ORDER — ONDANSETRON 2 MG/ML
4 INJECTION INTRAMUSCULAR; INTRAVENOUS EVERY 6 HOURS PRN
Status: DISCONTINUED | OUTPATIENT
Start: 2025-04-17 | End: 2025-04-17 | Stop reason: HOSPADM

## 2025-04-17 RX ORDER — ONDANSETRON 4 MG/1
4 TABLET, FILM COATED ORAL EVERY 8 HOURS PRN
Qty: 10 TABLET | Refills: 0 | Status: SHIPPED | OUTPATIENT
Start: 2025-04-17 | End: 2025-04-20

## 2025-04-17 RX ADMIN — FENTANYL CITRATE 25 MCG: 50 INJECTION INTRAMUSCULAR; INTRAVENOUS at 09:47

## 2025-04-17 RX ADMIN — ROCURONIUM BROMIDE 20 MG: 10 INJECTION, SOLUTION INTRAVENOUS at 09:48

## 2025-04-17 RX ADMIN — EPHEDRINE SULFATE 10 MG: 50 INJECTION, SOLUTION INTRAVENOUS at 08:33

## 2025-04-17 RX ADMIN — PROPOFOL 50 MG: 10 INJECTION, EMULSION INTRAVENOUS at 08:05

## 2025-04-17 RX ADMIN — SODIUM CHLORIDE: 0.9 INJECTION, SOLUTION INTRAVENOUS at 08:30

## 2025-04-17 RX ADMIN — PROPOFOL 30 MG: 10 INJECTION, EMULSION INTRAVENOUS at 08:06

## 2025-04-17 RX ADMIN — PROPOFOL 200 MG: 10 INJECTION, EMULSION INTRAVENOUS at 08:17

## 2025-04-17 RX ADMIN — LIDOCAINE HYDROCHLORIDE 50 MG: 10 INJECTION, SOLUTION EPIDURAL; INFILTRATION; INTRACAUDAL; PERINEURAL at 08:17

## 2025-04-17 RX ADMIN — ROCURONIUM BROMIDE 20 MG: 10 INJECTION, SOLUTION INTRAVENOUS at 11:07

## 2025-04-17 RX ADMIN — ONDANSETRON 4 MG: 2 INJECTION INTRAMUSCULAR; INTRAVENOUS at 10:15

## 2025-04-17 RX ADMIN — ONDANSETRON 4 MG: 2 INJECTION INTRAMUSCULAR; INTRAVENOUS at 12:53

## 2025-04-17 RX ADMIN — ROCURONIUM BROMIDE 50 MG: 10 INJECTION, SOLUTION INTRAVENOUS at 08:18

## 2025-04-17 RX ADMIN — FENTANYL CITRATE 25 MCG: 50 INJECTION INTRAMUSCULAR; INTRAVENOUS at 08:48

## 2025-04-17 RX ADMIN — EPHEDRINE SULFATE 10 MG: 50 INJECTION, SOLUTION INTRAVENOUS at 09:29

## 2025-04-17 RX ADMIN — MIDAZOLAM 2 MG: 1 INJECTION INTRAMUSCULAR; INTRAVENOUS at 07:55

## 2025-04-17 RX ADMIN — GLYCOPYRROLATE 0.2 MG: 0.2 INJECTION, SOLUTION INTRAMUSCULAR; INTRAVENOUS at 09:38

## 2025-04-17 RX ADMIN — SUGAMMADEX 200 MG: 100 INJECTION, SOLUTION INTRAVENOUS at 11:46

## 2025-04-17 RX ADMIN — DEXAMETHASONE SODIUM PHOSPHATE 10 MG: 10 INJECTION, SOLUTION INTRAMUSCULAR; INTRAVENOUS at 08:36

## 2025-04-17 RX ADMIN — SODIUM CHLORIDE, SODIUM LACTATE, POTASSIUM CHLORIDE, AND CALCIUM CHLORIDE: .6; .31; .03; .02 INJECTION, SOLUTION INTRAVENOUS at 07:27

## 2025-04-17 RX ADMIN — METOCLOPRAMIDE 10 MG: 5 INJECTION, SOLUTION INTRAMUSCULAR; INTRAVENOUS at 13:25

## 2025-04-17 RX ADMIN — PROPOFOL 20 MG: 10 INJECTION, EMULSION INTRAVENOUS at 08:07

## 2025-04-17 RX ADMIN — PHENYLEPHRINE HYDROCHLORIDE 30 MCG/MIN: 10 INJECTION INTRAVENOUS at 08:34

## 2025-04-17 RX ADMIN — FENTANYL CITRATE 50 MCG: 50 INJECTION INTRAMUSCULAR; INTRAVENOUS at 08:17

## 2025-04-17 NOTE — H&P
Surgical Oncology Consult                                        [facility] [place]  [address] [facility] [address]  [phone]     Shawna Marrufo  1965  [id number]  [facility] [place]  [address] [facility] [address]  [phone]             Chief Complaint   Patient presents with    Consult       Patient being seen for consult for PTH 56.3, Ca 11.1. CT para 12/9/2024- poss right.         Assessment/Plan:     No problem-specific Assessment & Plan notes found for this encounter.        Assessment  Diagnoses and all orders for this visit:     Hyperparathyroidism (HCC)     Hypercalcemia     Primary hyperparathyroidism (HCC)  -     Ambulatory referral to Surgical Oncology        Advance Care Planning/Advance Directives:  Discussed disease status, cancer treatment plans and/or cancer treatment goals with the patient.            Oncology History     No history exists.         History of Present Illness: 59-year-old woman recently moved here from [place] where she underwent parathyroidectomy in August of this year.  She has had persistent symptoms as well as persistent hypercalcemia.  She underwent CAT scan here which was suggestive of a right-sided target.  She has been referred for evaluation and treatment.  No history of kidney stones.  However she has issues with achiness, fatigue, constipation, and foggy thinking.  She states that she has a general sense of malaise.  No personal or family history of endocrine malignancies.     She is here for preop and consenting for parathyroidectomy.     Review of Systems   Constitutional:  Positive for fatigue.   Eyes: Negative.    Respiratory: Negative.     Cardiovascular: Negative.    Gastrointestinal:  Positive for constipation.   Genitourinary: Negative.    Skin: Negative.    Allergic/Immunologic: Negative.     Hematological: Negative.    Psychiatric/Behavioral:          Difficulty sleeping.  Foggy thinking.            Problem List         Patient Active Problem List   Diagnosis    Hyperparathyroidism (HCC)    Hypercalcemia    Other specified hypothyroidism    Iron deficiency    Osteopenia    Vitamin D deficiency         Medical History           Past Medical History:   Diagnosis Date    Disease of thyroid gland      Headache(784.0)           Surgical History             Past Surgical History:   Procedure Laterality Date    KNEE SURGERY        PARATHYROID GLAND SURGERY             Family History             Family History   Problem Relation Age of Onset    Stroke Mother           She is 90. Stroke occurred 1 year ago.    Dementia Father      Coronary artery disease Brother      Heart disease Brother      Thyroid disease Sister           Social History                   Socioeconomic History    Marital status: Single       Spouse name: Not on file    Number of children: Not on file    Years of education: Not on file    Highest education level: Not on file   Occupational History    Not on file   Tobacco Use    Smoking status: Never    Smokeless tobacco: Never   Vaping Use    Vaping status: Never Used   Substance and Sexual Activity    Alcohol use: Yes       Alcohol/week: 3.0 standard drinks of alcohol       Types: 3 Glasses of wine per week    Drug use: Never    Sexual activity: Not Currently       Partners: Male       Birth control/protection: Post-menopausal   Other Topics Concern    Not on file   Social History Narrative     Single     Previous work - in HR in [place]     Working - [employer]      Social Drivers of Health      Financial Resource Strain: Not on file   Food Insecurity: Not on file   Transportation Needs: Not on file   Physical Activity: Not on file   Stress: Not on file   Social Connections: Not on file   Intimate Partner Violence: Not on file   Housing Stability: Not on file            Current  Medications      Current Outpatient Medications:     Cholecalciferol (Vitamin D3) 50 MCG (2000 UT) capsule, Take 1 capsule (2,000 Units total) by mouth daily, Disp: , Rfl:     levothyroxine (Synthroid) 50 mcg tablet, Take 1 tablet (50 mcg total) by mouth daily, Disp: 90 tablet, Rfl: 0    Omega-3 Fatty Acids (Fish Oil) 1200 MG CAPS, Take 1 capsule (1,200 mg total) by mouth Daily at 2am, Disp: , Rfl:               Allergies   Allergen Reactions    Sulfa Antibiotics Rash            Vitals:   /74   Pulse 55   Temp 97.6 °F (36.4 °C) (Temporal)   SpO2 100%     Physical Exam  Vitals reviewed.   Constitutional:       Appearance: Normal appearance.   HENT:      Head: Normocephalic and atraumatic.      Right Ear: External ear normal.      Left Ear: External ear normal.   Eyes:      Extraocular Movements: Extraocular movements intact.      Pupils: Pupils are equal, round, and reactive to light.   Cardiovascular:      Rate and Rhythm: Normal rate and regular rhythm.      Pulses: Normal pulses.      Heart sounds: Normal heart sounds.   Pulmonary:      Effort: Pulmonary effort is normal.      Breath sounds: Normal breath sounds.   Abdominal:      General: Abdomen is flat.      Palpations: Abdomen is soft.   Genitourinary:     General: Normal vulva.      Rectum: Normal.   Musculoskeletal:         General: No tenderness. Normal range of motion.      Cervical back: Normal range of motion and neck supple.   Skin:     General: Skin is warm and dry.   Neurological:      General: No focal deficit present.      Mental Status: She is alert and oriented to person, place, and time.   Psychiatric:         Mood and Affect: Mood normal.         Thought Content: Thought content normal.            Pathology:        Labs:            Lab Results   Component Value Date     PTH 56.3 11/05/2024     CALCIUM 11.1 (H) 11/05/2024     PHOS 3.4 11/05/2024               Lab Results   Component Value Date     PTH 56.3 11/05/2024     CALCIUM 11.1 (H)  11/05/2024     PHOS 3.4 11/05/2024            Imaging  CT parathyroid study w wo contrast  Result Date: 12/10/2024  Narrative: CTA  NECK  WITHOUT AND WITH CONTRAST FROM KERA TO SKULL BASE INDICATION: Hyperparathyroidism. primary hyperparathyroidism COMPARISON:  None. TECHNIQUE:  Both noncontrast, contrast, and post contrast delayed images were performed according to standard parathyroid protocol (4D technique) Coronal and sagittal reconstructions were performed. 3D reconstructions were performed on an independent workstation, and are supplied for review.  This examination, like all CT scans performed in the [facility], was performed utilizing techniques to minimize radiation dose exposure, including the use of iterative reconstruction and automated exposure control.  Rad dose 1355.51 mGy-cm IV Contrast:  75 mL of iohexol (OMNIPAQUE) FINDINGS: SERIAL NONCONTRAST, POST CONTRAST AND DELAYS: 0.4 cm enhancing nodule in right tracheoesophageal groove posteriorly adjacent to right lower thyroid lobe (302:160). 1.0 cm heterogeneously enhancing nodule with internal cystic change and tiny calcification inferiorly adjacent to right lower thyroid lobe (302:147). VASCULAR STRUCTURES:  Normal enhancement of the cervical vasculature. Left ICA cervical loop and tortuous right ICA distal cervical segment. VISUALIZED BRAIN PARENCHYMA: Not included in the field-of-view. VISUALIZED PARANASAL SINUSES:  Normal. NASAL CAVITY AND NASOPHARYNX: Normal. SUPRAHYOID NECK: Dental amalgam with extensive beam hardening artifact even with metal artifact reduction limits evaluation of anterior oral cavity for which direct visualization is recommended. Small circumscribed hyperdensity in right buccal mucosa (301:292), likely ingested material. Otherwise, normal visualized oral cavity within limitations. Normal oropharynx, parapharyngeal and retropharyngeal spaces. INFRAHYOID NECK: Larynx and hypopharynx are normal. Glottic and  subglottic airway are normal. THYROID GLAND:   Heterogeneously enhancing thyroid gland with bilateral subcentimeter nodules. Incidental discovery of one or more thyroid nodule(s) measuring less than 1.5 cm and without suspicious features is noted in this patient who is above 35 years  old; according to guidelines published in the February 2015 white paper on incidental thyroid nodules in the Journal of the American College of Radiology (JACR), no further evaluation is recommended. LYMPH NODES:  No pathologic or enlarged adenopathy. MEDIASTINUM:  Unremarkable. BONY STRUCTURES: No acute fracture or destructive osseous lesion. Moderate multilevel degenerative changes of cervical spine, worse at C5-C6. LUNG APICES: Mild biapical fibrotic change. No focal consolidation in visualized upper lung zones. NASCET criteria was used to determine the degree of internal carotid artery diameter stenosis.      Impression: 0.4 cm candidate parathyroid adenoma in right tracheoesophageal groove posteriorly adjacent to right lower thyroid lobe 1.0 cm candidate parathyroid adenoma inferiorly adjacent to right lower thyroid lobe. Alternative consideration includes sequestered thyroid nodule. Small circumscribed hyperdensity in right buccal mucosa, likely ingested material (i.e. gum or hard candy). Recommend direct visualization for further evaluation. Additional chronic/incidental findings as detailed above. The study was marked in EPIC for significant notification. Workstation performed: [id number]      XR spine lumbar minimum 4 views non injury  Result Date: 12/3/2024  Narrative: LUMBAR SPINE INDICATION:   Low back pain, unspecified. Other chronic pain. COMPARISON:  None. VIEWS:  XR SPINE LUMBAR MINIMUM 4 VIEWS NON INJURY FINDINGS: There are 5 non rib bearing lumbar vertebral bodies. There is no destructive osseous lesion. Alignment is unremarkable. Mild disc space narrowing at L4-L5. The pedicles appear intact. Soft tissues are  unremarkable.      Impression: Mild disc space narrowing at L4-L5. Electronically signed: 12/03/2024 09:38 AM Velasquez Ruiz MD     XR hip/pelv 2-3 vws right if performed  Result Date: 12/3/2024  Narrative: RIGHT HIP INDICATION:   Pain in right hip. COMPARISON:  None. VIEWS:  XR HIP/PELV 2-3 VWS RIGHT W PELVIS IF PERFORMED FINDINGS: There is no acute displaced fracture or dislocation. Slight right coxa profunda. Minimal degenerative changes right hip. No lytic or blastic osseous lesion. Soft tissues are unremarkable. The visualized lumbar spine is unremarkable.      Impression: Slight right coxa profunda. Minimal degenerative changes right hip. Electronically signed: 12/03/2024 09:37 AM Velasquez Ruiz MD     XR sacroiliac joints < 3 views  Result Date: 12/3/2024  Narrative: SACROILIAC JOINTS INDICATION:   Low back pain, unspecified. Other chronic pain. COMPARISON:  None. VIEWS:  XR SACROILIAC JOINTS < 3 VIEWS FINDINGS: The SI joints appear symmetric without focal erosions or joint space widening. Sacral arcuate lines appear intact. No fracture or pathologic bone lesions seen. Included portions of the pelvis and lumbar spine are unremarkable.      Impression: Unremarkable SI joints. Electronically signed: 12/03/2024 09:36 AM Velasquez Ruiz MD        Result Text   PARATHYROID SCAN     INDICATION:  E21.3: Hyperparathyroidism, unspecified     COMPARISON: CT parathyroid scan dated 12/9/2024     TECHNIQUE:   Following the intravenous administration of 27.0 mCi Tc-99m Cardiolite, anterior and bilateral anterior oblique projection images of the neck and mediastinum were obtained at approximately 10 minutes post injection followed at 2 hours post   injection by static anterior and bilateral oblique projections as well as SPECT CT images reconstructed in the coronal, sagittal and axial projections.     FINDINGS:     Immediate: Immediate images demonstrate homogenous uptake of the radiotracer by the thyroid.     Delayed: Delayed  images demonstrate normal washout of the radiotracer from the thyroid. There are no persistent foci of activity that would be suggestive of a parathyroid adenoma. Specifically, there is no persistent focal tracer activity about the right   thyroid lobe in region of small enhancing nodules seen on recent CT parathyroid scan.     Low-dose unenhanced CT performed for localization purposes demonstrates:  -Emphysematous changes     IMPRESSION:     1. No scintigraphic evidence of a parathyroid adenoma.     Specifically, there is no definitive focal tracer activity on delayed imaging in the region of small enhancing nodules seen about the right thyroid lobe on CT parathyroid scan dated 12/9/2024.        Workstation performed: [id number]         I reviewed the above laboratory and imaging data.     Discussion/Summary: 59-year-old woman, primary hyperparathyroidism.  Right-sided target noted on CAT scan.  Prior op report suggests removal of 2 parathyroid glands on the right however.  She would be amenable to reexploration to address what appears to be residual disease.  Risk and benefits of procedure include infection, bleeding, nerve injury, need for possible additional surgery discussed.  Will therefore plan on right parathyroid reexploration with flexible indirect laryngoscopy, possible 4-gland exploration with intraoperative PTH monitoring.

## 2025-04-17 NOTE — ANESTHESIA PREPROCEDURE EVALUATION
Procedure:  DIAGNOSTIC FLEXIBLE LARYNGOSCOPY (Throat)  MINIMALLY INVASIVE RIGHT PARATHYROIDECTOMY, POSSIBLE FOUR GLAND EXPLORATION (Right: Neck)  MONITORING INTRAOPERATIVE PTH (PARATHYROID HORMONE) (Neck)    Relevant Problems   ANESTHESIA (within normal limits)      CARDIO (within normal limits)      ENDO   (+) Hyperparathyroidism (HCC)   (+) Other specified hypothyroidism      GI/HEPATIC (within normal limits)      /RENAL (within normal limits)      GYN (within normal limits)      HEMATOLOGY (within normal limits)      MUSCULOSKELETAL (within normal limits)      NEURO/PSYCH (within normal limits)      PULMONARY (within normal limits)      Surgery/Wound/Pain   (+) History of parathyroidectomy        Physical Exam    Airway    Mallampati score: II  TM Distance: >3 FB  Neck ROM: full     Dental   No notable dental hx     Cardiovascular  Cardiovascular exam normal    Pulmonary  Pulmonary exam normal     Other Findings  post-pubertal.      Anesthesia Plan  ASA Score- 2     Anesthesia Type- general with ASA Monitors.         Additional Monitors: arterial line.    Airway Plan: ETT.           Plan Factors-Exercise tolerance (METS): >4 METS.    Chart reviewed. EKG reviewed. Imaging results reviewed. Existing labs reviewed. Patient summary reviewed.    Patient is not a current smoker.              Induction- intravenous.    Postoperative Plan- Plan for postoperative opioid use. Planned trial extubation    Perioperative Resuscitation Plan - Level 1 - Full Code.       Informed Consent- Anesthetic plan and risks discussed with patient.  I personally reviewed this patient with the CRNA. Discussed and agreed on the Anesthesia Plan with the CRNA..      NPO Status:  Vitals Value Taken Time   Date of last liquid 04/17/25 04/17/25 0706   Time of last liquid 0445 04/17/25 0706   Date of last solid 04/16/25 04/17/25 0706   Time of last solid 2100 04/17/25 0706       Discussed General Anesthesia with patient including but not limited  to risk of cardiac insult, pulmonary complication, stroke, reaction to medications and death. All questions answered and consent was obtained.

## 2025-04-17 NOTE — ANESTHESIA PROCEDURE NOTES
"Arterial Line Insertion    Performed by: Lee Shannon MD  Authorized by: Lee Shannon MD  Consent: Verbal consent obtained. Written consent obtained.  Patient understanding: patient states understanding of the procedure being performed  Patient consent: the patient's understanding of the procedure matches consent given  Procedure consent: procedure consent matches procedure scheduled  Relevant documents: relevant documents present and verified  Test results: test results available and properly labeled  Site marked: the operative site was marked  Radiology Images: Radiology Images displayed and confirmed. If images not available, report reviewed  Patient identity confirmed: verbally with patient and arm band  Time out: Immediately prior to procedure a \"time out\" was called to verify the correct patient, procedure, equipment, support staff and site/side marked as required.  Preparation: Patient was prepped and draped in the usual sterile fashion.  Indications: multiple ABGs and hemodynamic monitoring  Orientation:  Left  Location: radial artery  Procedure Details:      Needle gauge: 20  Placement technique:  Anatomical landmarks  Number of attempts: 1    Post-procedure:  Post-procedure: dressing applied  Waveform: good waveform  Post-procedure CNS: normal  Patient tolerance: patient tolerated the procedure well with no immediate complications          "

## 2025-04-17 NOTE — DISCHARGE INSTR - AVS FIRST PAGE
Please follow-up as scheduled with Dr. Zuniga. If you do not already have a follow-up appointment, please call the office when you leave to schedule an appointment to be seen in 2-3 weeks for post-operative re-evaluation.    Activity:  - No lifting greater than 20 pounds or strenuous physical activity or exercise for 2 weeks.  - Walking and normal light activities are encouraged.  - Normal daily activities including climbing steps are okay.  - No driving until no longer using pain medications.    Diet:    - You may resume your normal diet.    Wound Care:  - May shower daily starting on 4/19. No tub baths or swimming until cleared by your surgeon.  - Wash incision gently with soap and water and pat dry. Do not scrub. Do not submerge incision.  - Your incision is closed with absorbable sutures and steri strips. Please leave these on- they will fall off on their own.   - Do not apply any creams or ointments unless instructed to do so by your surgeon.  - You may apply ice as needed (no longer than 20 minutes at a time) for the first 48 hours.  - Bruising is not unusual and will go away with a little time. You may apply a warm, moist compress that may help the bruising resolve quicker.  - You may remove the dressings the day after surgery (unless otherwise instructed). Leave any skin tapes (steri-strips) on the incision(s) in place until they fall off on their own. Any new dressings are optional.    Medications:     - You should continue your current medication regimen after going home unless otherwise instructed. Please refer to your discharge medication list for further details.  - Please take the pain medications as directed.  - You are encouraged to use non-narcotic pain medications first and whenever possible. Reserve the use of narcotic pain medication for moderate to severe pain not controlled by non-narcotic medications.  - No driving while taking narcotic pain medications.  - You may become constipated,  especially if taking pain medications. You may take any over the counter stool softeners or laxatives as needed. Examples: Milk of Magnesia, Colace, Senna.    Additional Instructions:  - If you have any questions or concerns after discharge please call the office.  - If you experience any numbness or tingling around your mouth or in hands/fingers, take two Tums and call the office or return to the ER.  - If you experience increasing neck swelling with difficulty swallowing/breathing return to ER immediately.  - Call office or return to ER if fever greater than 101, chills, persistent nausea/vomiting, worsening/uncontrollable pain, and/or increasing redness or purulent/foul smelling drainage from incision(s).

## 2025-04-17 NOTE — ANESTHESIA POSTPROCEDURE EVALUATION
Post-Op Assessment Note    CV Status:  Stable  Pain Score: 0    Pain management: adequate       Mental Status:  Alert and awake   Hydration Status:  Euvolemic   PONV Controlled:  Controlled   Airway Patency:  Patent     Post Op Vitals Reviewed: Yes    No anethesia notable event occurred.    Staff: CRNA           Last Filed PACU Vitals:  Vitals Value Taken Time   Temp 98.8 °F (37.1 °C) 04/17/25 1215   Pulse 75 04/17/25 1219   /76 04/17/25 1215   Resp 18 04/17/25 1219   SpO2 95 % 04/17/25 1219   Vitals shown include unfiled device data.    Modified Cherise:     Vitals Value Taken Time   Activity 2 04/17/25 1215   Respiration 2 04/17/25 1215   Circulation 2 04/17/25 1215   Consciousness 2 04/17/25 1215   Oxygen Saturation 2 04/17/25 1215     Modified Cherise Score: 10

## 2025-04-17 NOTE — OP NOTE
OPERATIVE REPORT  PATIENT NAME: Shawna Marrufo    :  1965  MRN: [id number]  Pt Location: [facility] OR ROOM 07    SURGERY DATE: 2025    Surgeons and Role:     * Jose Juan Zuniga MD - Primary     * Seun Albright MD - Assisting    Preop Diagnosis:  Hyperparathyroidism (HCC) [E21.3]  Hypercalcemia [E83.52]  History of parathyroidectomy [Z98.890, Z90.89]    Post-Op Diagnosis Codes:     * Hyperparathyroidism (HCC) [E21.3]     * Hypercalcemia [E83.52]     * History of parathyroidectomy [Z98.890, Z90.89]    Procedure(s):  DIAGNOSTIC FLEXIBLE LARYNGOSCOPY  Right - MINIMALLY INVASIVE RIGHT PARATHYROIDECTOMY.  FOUR GLAND EXPLORATION  TWO GLAND PARATHYROIDECTOMY  MONITORING INTRAOPERATIVE PTH (PARATHYROID HORMONE)    Specimen(s):  ID Type Source Tests Collected by Time Destination   1 : Right  inferior parathyroid Tissue Parathyroid TISSUE EXAM Jose Juan Zuniga MD 2025 0724    2 : LEFT INFERIOR PARATHYROID Tissue Parathyroid TISSUE EXAM Jose Juan Zuniga MD 2025 0951    3 : R/O RIGHT PARATHYROID Tissue Parathyroid TISSUE EXAM Jose Juan Zuniga MD 2025 1112    4 : R/O RIGHT PARATHYROID   #2 Tissue Parathyroid TISSUE EXAM Jose Juan Zuniga MD 2025 1119        Estimated Blood Loss:   10 mL    Drains:  * No LDAs found *    Anesthesia Type:   Local    Operative Indications:  Hyperparathyroidism (HCC) [E21.3]  Hypercalcemia [E83.52]  History of parathyroidectomy [Z98.890, Z90.89]      Operative Findings:    Bilateral and symmetrical vocal cord mobility with flexible laryngoscopy.    Right sided thyroid nodule, separate from main body of thyroid below inferior pole.    Left-inferior parathyroid gland, 220 mg.  normal-appearing left superior parathyroid gland left in-situ.  right inferior parathyroid gland, 100 mg.    Component      Latest Ref Rng 2025  6:48 AM 2025  9:25 AM 2025  9:30 AM 2025  9:35 AM 2025  10:07 AM 2025  10:12 AM   PTH INTRAOP      12.0 - 88.0 pg/mL 63.6   241.8 (H)  175.7 (H)  156.3 (H)  85.5  80.2      Component      Latest Ref Rng 4/17/2025  10:17 AM 4/17/2025  10:52 AM 4/17/2025  11:21 AM 4/17/2025  11:26 AM 4/17/2025  11:31 AM   PTH INTRAOP      12.0 - 88.0 pg/mL 82.4  60.1  72.9  34.4  22.8       Legend:  (H) High  Complications:   None    Procedure and Technique:  The patient was brought to the OR and identified by proper time-out.  We then did flexible indirect laryngoscopy and confirmed bilateral symmetrical vocal cord mobility.     Following this she was intubated by the anesthesia team, then was prepped and draped with the neck exposed in the extended position.  Local anesthesia was given, then a 3 cm incision was made sharply 2 finger breaths above the sternal notch and the prior scar.  Cautery was used to dissect through the dermis subcutaneous tissue. Wheatlanner  retractor was placed.  The platysma was then transected with cautery.  Strap muscles were then divided the midline. This exposed the surface of the thyroid.  We dissected the strap muscles off the anterolateral aspect of the right thyroid lobe.  This enabled us to dissect between the thyroid and the strap muscles.  The thyroid gland was then retracted medially and anteriorly which exposed what appeared to be a large parathyroid gland along the inferior pole of the thyroid gland.  This was dissected out from surrounding tissue in hemostatic fashion with cautery.  This was the target which was seen on the CAT scan.  We saw the recurrent laryngeal nerve and preserved it using bipolar rather than cautery to minimize injury.     We saw what appeared to be a vascular pedicle and clipped it.  This was sent to pathology for frozen section.  PTH levels were drawn at 0, 5, and 10 minutes post removal of this target.  Frozen section confirmed thyroid nodule and PTH levels did not decrease appropriately.  Given scar tissue on the right side, we elected to explore the left side of the neck.     Strap  muscles were mobilized off the left thyroid lobe. This enabled us to dissect between the thyroid and the strap muscles.  The thyroid gland was then retracted medially and anteriorly which exposed what appeared to be a large parathyroid gland along the inferior pole of the thyroid gland.  This was dissected out from surrounding tissue in hemostatic fashion with cautery.  PTH levels were sent after the pedicle was clipped at 0, 5, and 10 minutes post removal of the gland.  Frozen section confirmed this to be a 220 mg parathyroid gland.  At this point, levels ultimately came down, though they still remained higher than baseline levels.  We therefore opted to keep on looking.  We found ultimately what appeared to be a normal-appearing left superior gland.  Given that prior right sided parathyroidectomy had been performed, and reviewed removed the left lower gland, and given that we had found the left upper gland.  We opted to reexplore the right side of the neck.      We adjusted our retractors, and rotated the right thyroid anteromedially.  We explored the right inferior thyroid pedicle and saw what appeared to be a gland tucked within the vessels.  We dissected this out from the surrounding tissue. Vascular pedicle was visualized. The pedicle was clipped.  PTH levels were drawn at 0 min, 5 min, and 10 min after the gland was resected.     Levels decreased appropriately to normal range upon 10 min post resection of the gland.  We then irrigated the field and closed using 3-0 Vicryl to close the strap muscles the midline followed by 3-0 Vicryl to close the platysma, followed by 4-0 Vicryl close the dermis, followed by 5-0 Monocryl to close skin in subcuticular fashion.  Benzoin and Steri-Strips were then applied in the usual fashion.  The patient was awakened transferred to the recovery room in stable condition.     I was present for the entire procedure.    Patient Disposition:  PACU              SIGNATURE: Jose Juan  MD Nadia  DATE: April 17, 2025  TIME: 11:43 AM

## 2025-04-18 ENCOUNTER — TELEPHONE (OUTPATIENT)
Age: 60
End: 2025-04-18

## 2025-04-18 NOTE — TELEPHONE ENCOUNTER
Received a call from the patient stating she just had a parathyroidectomy yesterday (4/17/25). She checked with her surgeon's office about getting her Calcium and Thyroid levels checked before her next appointment at our office, which is 4/24/25. They informed her to contact our office to get those labs ordered.    If appropriate, the patient is asking to have labs ordered for her Calcium level and Thyroid.    She is asking for a call back in regards. She said it is ok to leave a voicemail. Thanks!

## 2025-04-18 NOTE — TELEPHONE ENCOUNTER
Spoke to patient. She had parathyroidectomy yesterday and states she feels very good. Her voice and strong and there is no hoarseness. She asked about getting calcium labs checked before she sees her endocrinologist next week. Patient was advised to reach out to them to see if that is necessary, and they will order it for her. She verbalized understanding and thanks.

## 2025-04-18 NOTE — TELEPHONE ENCOUNTER
Pt asked if she can get her calcium and thyroid blood work tested because she wanted to ask her endocrinologist about new medication. She wanted to know if it was too soon after surgery

## 2025-04-21 ENCOUNTER — TELEPHONE (OUTPATIENT)
Age: 60
End: 2025-04-21

## 2025-04-21 NOTE — TELEPHONE ENCOUNTER
How does the incision look? WNL    Do you have fever or chills? No    Are you having any pain? Yes     Is it controlled with your pain medication?N/A    What medications are you currently taking for pain control? None. Pain is tolerable    Do you have a drain(s)? No    Verify post-op appointment date and time  [x] 4/28@11am    Do you have any other questions or concerns? Pt called endocrinologist for calcium check as advised by our office. Still waiting for them to call her back. Reviewed notes and advised, her message was sent to provider and is being reviewed.     **NOTE TO TRIAGER: If patient requires further triage, based upon the answers above, move to appropriate triage protocol.

## 2025-04-22 PROCEDURE — 88305 TISSUE EXAM BY PATHOLOGIST: CPT | Performed by: PATHOLOGY

## 2025-04-24 ENCOUNTER — OFFICE VISIT (OUTPATIENT)
Dept: ENDOCRINOLOGY | Facility: CLINIC | Age: 60
End: 2025-04-24
Payer: COMMERCIAL

## 2025-04-24 ENCOUNTER — APPOINTMENT (OUTPATIENT)
Dept: LAB | Facility: CLINIC | Age: 60
End: 2025-04-24
Attending: INTERNAL MEDICINE
Payer: COMMERCIAL

## 2025-04-24 VITALS
SYSTOLIC BLOOD PRESSURE: 104 MMHG | BODY MASS INDEX: 20.68 KG/M2 | DIASTOLIC BLOOD PRESSURE: 62 MMHG | TEMPERATURE: 97.6 F | HEART RATE: 68 BPM | OXYGEN SATURATION: 98 % | WEIGHT: 136 LBS | RESPIRATION RATE: 16 BRPM

## 2025-04-24 DIAGNOSIS — E03.8 OTHER SPECIFIED HYPOTHYROIDISM: ICD-10-CM

## 2025-04-24 DIAGNOSIS — M85.89 OSTEOPENIA OF MULTIPLE SITES: ICD-10-CM

## 2025-04-24 DIAGNOSIS — E21.3 HYPERPARATHYROIDISM (HCC): Primary | ICD-10-CM

## 2025-04-24 DIAGNOSIS — E55.9 VITAMIN D DEFICIENCY: ICD-10-CM

## 2025-04-24 DIAGNOSIS — E21.3 HYPERPARATHYROIDISM (HCC): ICD-10-CM

## 2025-04-24 LAB
ALBUMIN SERPL BCG-MCNC: 4.4 G/DL (ref 3.5–5)
ALP SERPL-CCNC: 94 U/L (ref 34–104)
ALT SERPL W P-5'-P-CCNC: 18 U/L (ref 7–52)
ANION GAP SERPL CALCULATED.3IONS-SCNC: 5 MMOL/L (ref 4–13)
AST SERPL W P-5'-P-CCNC: 23 U/L (ref 13–39)
BILIRUB SERPL-MCNC: 0.42 MG/DL (ref 0.2–1)
BUN SERPL-MCNC: 24 MG/DL (ref 5–25)
CA-I BLD-SCNC: 1.23 MMOL/L (ref 1.12–1.32)
CALCIUM SERPL-MCNC: 9.8 MG/DL (ref 8.4–10.2)
CHLORIDE SERPL-SCNC: 102 MMOL/L (ref 96–108)
CO2 SERPL-SCNC: 29 MMOL/L (ref 21–32)
CREAT SERPL-MCNC: 0.82 MG/DL (ref 0.6–1.3)
GFR SERPL CREATININE-BSD FRML MDRD: 79 ML/MIN/1.73SQ M
GLUCOSE SERPL-MCNC: 56 MG/DL (ref 65–140)
POTASSIUM SERPL-SCNC: 3.9 MMOL/L (ref 3.5–5.3)
PROT SERPL-MCNC: 7.9 G/DL (ref 6.4–8.4)
PTH-INTACT SERPL-MCNC: 21.9 PG/ML (ref 12–88)
SODIUM SERPL-SCNC: 136 MMOL/L (ref 135–147)
T4 FREE SERPL-MCNC: 0.97 NG/DL (ref 0.61–1.12)
TSH SERPL DL<=0.05 MIU/L-ACNC: 0.33 UIU/ML (ref 0.45–4.5)

## 2025-04-24 PROCEDURE — 84443 ASSAY THYROID STIM HORMONE: CPT

## 2025-04-24 PROCEDURE — 36415 COLL VENOUS BLD VENIPUNCTURE: CPT

## 2025-04-24 PROCEDURE — 82330 ASSAY OF CALCIUM: CPT

## 2025-04-24 PROCEDURE — 84439 ASSAY OF FREE THYROXINE: CPT

## 2025-04-24 PROCEDURE — 83970 ASSAY OF PARATHORMONE: CPT

## 2025-04-24 PROCEDURE — 99214 OFFICE O/P EST MOD 30 MIN: CPT | Performed by: INTERNAL MEDICINE

## 2025-04-24 PROCEDURE — 80053 COMPREHEN METABOLIC PANEL: CPT

## 2025-04-24 NOTE — ASSESSMENT & PLAN NOTE
She is post rt parathyroidectomy *2 on 4/17/25 associated with clinical improvement and intraoperative PTH down to 22pg/mL.    We agreed today to use calcium carbonate 1000-1200mg daily along with vit D3 2000IU daily OTC.    We do not have pathology from last surgery in [place].  She had persistent elevation in PTH and hypercalcemia suggesting either a duplicate rt inferior parathyroid adenoma or a failed resection last attempted surgery 8/19/24.    Repeat labs now and then again in 6 months prior to next visit.

## 2025-04-24 NOTE — ASSESSMENT & PLAN NOTE
She reports some symptoms that are consistent with hypothyroidism but there was no clear biochemical evidence.    Today we discussed all aspects of hypothyroidism including normal thyroid physiology, pathophysiology, review of data, treatment options, dose titration and follow up needs.    Continue levothyroxine 50mcg qdaily.

## 2025-04-24 NOTE — PROGRESS NOTES
Follow-up Patient Progress Note      CC: hyperparathyroidism     History of Present Illness:   58 yr female with Hx hypothyroidism diagnosed age 27(did not take meds until recently), menorrhagia s/p D&C 2/23, LMP 2022, hyperparathyroidism s/p parathyroidectomy 8/19/24 Rt inferior, persistent hypercalcemia - s/p 2* rt parathyroidectomy 4/17/25. Last visit was 2/26/25.     Since last visit, she has 2* rt parathyroidectomies and 4 gland exploration.    Current: levothyroxine 50mcg qdaily.     She felt severe fatigue associated with sleep disturbance, agitation, mood disturbance, brain fog and polyuria. She had initial labs 3/2024 showing hypothyroidism, hypercalcemia dn iron deficiency. No Hx nephrolithiasis.     24 hr urine 5/6/2024 - calcium was 611mg/24 hrs with 3.5liters UOP.  7/28/24 PTH 66pg/mL, calcium 11.4mg/dL, Alb 4.7g/dL, fT4 0.9 ng/dL, TSH  3.5uIU/mL  7/25/24 US thyroid: 1.3cm*0.8cm*0.8cm Rt circumscribes mass inferior  5/31/2024 Nuclear sestamibi: Rt inferior parathyroid adenoma.  5/6/2024 DXA: osteopenia -  T scores -1.1 LS, -1.4 LTH, -1.7 LFN. 10 yr FRAX 0.7% hip and 6.8% major fracture.  8/19/2024 Surgical parathyroidectomy - Rt inferior parathyroid Dr. Richard Seymour     12/9/24 CT parathyroid:   - 0.4 cm candidate parathyroid adenoma in right tracheoesophageal groove posterior to right lower thyroid lobe  - 1.0 cm candidate parathyroid adenoma inferiorly adjacent to right lower thyroid lobe. Alternative consideration includes sequestered thyroid nodule.  1/3/25 NM parathyroid: did not isolate.      Physical Exam:  Body mass index is 20.68 kg/m².  /62 (BP Location: Left arm, Patient Position: Sitting)   Pulse 68   Temp 97.6 °F (36.4 °C) (Tympanic)   Resp 16   Wt 61.7 kg (136 lb)   SpO2 98%   BMI 20.68 kg/m²    Vitals:    04/24/25 0834   Weight: 61.7 kg (136 lb)        Physical Exam  Constitutional:       General: She is not in acute distress.     Appearance: She is well-developed.  "  HENT:      Head: Normocephalic and atraumatic.      Nose: Nose normal.   Eyes:      Conjunctiva/sclera: Conjunctivae normal.   Pulmonary:      Effort: Pulmonary effort is normal.   Abdominal:      General: There is no distension.   Musculoskeletal:      Cervical back: Normal range of motion and neck supple.   Skin:     Findings: No rash.      Comments: No icterus   Neurological:      Mental Status: She is alert and oriented to person, place, and time.         Labs:   No results found for: \"HGBA1C\"    Lab Results   Component Value Date    BNI4KZFMQUHK 4.781 (H) 11/05/2024       Lab Results   Component Value Date    CREATININE 0.95 03/25/2025    CREATININE 0.95 11/05/2024    BUN 23 03/25/2025    K 4.0 03/25/2025     03/25/2025    CO2 29 03/25/2025     eGFR   Date Value Ref Range Status   03/25/2025 66 ml/min/1.73sq m Final       Lab Results   Component Value Date    ALT 20 03/25/2025    AST 26 03/25/2025    ALKPHOS 88 03/25/2025       Lab Results   Component Value Date    CHOLESTEROL 171 03/25/2025     Lab Results   Component Value Date    HDL 55 03/25/2025     Lab Results   Component Value Date    TRIG 96 03/25/2025     Lab Results   Component Value Date    NONHDLC 116 03/25/2025         Assessment/Plan:    1. Hyperparathyroidism (HCC)  Assessment & Plan:  She is post rt parathyroidectomy *2 on 4/17/25 associated with clinical improvement and intraoperative PTH down to 22pg/mL.    We agreed today to use calcium carbonate 1000-1200mg daily along with vit D3 2000IU daily OTC.    We do not have pathology from last surgery in [place].  She had persistent elevation in PTH and hypercalcemia suggesting either a duplicate rt inferior parathyroid adenoma or a failed resection last attempted surgery 8/19/24.    Repeat labs now and then again in 6 months prior to next visit.  Orders:  -     Calcium, ionized; Future  -     PTH, intact; Future  -     Comprehensive metabolic panel; Future  2. Other specified " hypothyroidism  Assessment & Plan:  She reports some symptoms that are consistent with hypothyroidism but there was no clear biochemical evidence.    Today we discussed all aspects of hypothyroidism including normal thyroid physiology, pathophysiology, review of data, treatment options, dose titration and follow up needs.    Continue levothyroxine 50mcg qdaily.      Orders:  -     T4, free; Future  -     TSH, 3rd generation; Future  3. Osteopenia of multiple sites  Assessment & Plan:  We may repeat a DXA bone scan in 1-2 years from last one. Consider next year.  4. Vitamin D deficiency        I have spent a total time of  minutes on 04/24/25 in caring for this patient including greater than 50% of this time was spent in counseling/coordination of care as listed above.       Discussed with the patient and all questioned fully answered. She will contact me with concerns.    Kevin Ellis

## 2025-04-25 ENCOUNTER — TELEPHONE (OUTPATIENT)
Age: 60
End: 2025-04-25

## 2025-04-25 NOTE — TELEPHONE ENCOUNTER
Patient calling in again.  Attempted to call [place] office, unable to make connections, please call patient.

## 2025-04-25 NOTE — TELEPHONE ENCOUNTER
Patient called in said she saw her thyroid lab results, asks if provider wants her to change her dose?  She is asking if this can get addressed today.  Reports has been having some palpitations.  Takes 50 mcg daily as per med list.

## 2025-04-28 ENCOUNTER — OFFICE VISIT (OUTPATIENT)
Dept: SURGICAL ONCOLOGY | Facility: CLINIC | Age: 60
End: 2025-04-28

## 2025-04-28 VITALS
HEART RATE: 74 BPM | TEMPERATURE: 98.4 F | HEIGHT: 68 IN | DIASTOLIC BLOOD PRESSURE: 64 MMHG | SYSTOLIC BLOOD PRESSURE: 118 MMHG | WEIGHT: 137 LBS | OXYGEN SATURATION: 98 % | RESPIRATION RATE: 16 BRPM | BODY MASS INDEX: 20.76 KG/M2

## 2025-04-28 DIAGNOSIS — Z98.890 S/P PARATHYROIDECTOMY: ICD-10-CM

## 2025-04-28 DIAGNOSIS — E21.3 HYPERPARATHYROIDISM (HCC): Primary | ICD-10-CM

## 2025-04-28 DIAGNOSIS — E83.52 HYPERCALCEMIA: ICD-10-CM

## 2025-04-28 DIAGNOSIS — Z90.89 S/P PARATHYROIDECTOMY: ICD-10-CM

## 2025-04-28 PROCEDURE — 99024 POSTOP FOLLOW-UP VISIT: CPT | Performed by: SURGERY

## 2025-04-28 NOTE — LETTER
April 28, 2025     Brenda Kaminski MD  [address]  [address]  [place] [address]    Patient: Shawna Marrufo   YOB: 1965   Date of Visit: 4/28/2025       Dear Dr. Brenda Kaminski MD:    Thank you for referring Shawna Marrufo to me for evaluation. Below are my notes for this consultation.    If you have questions, please do not hesitate to call me. I look forward to following your patient along with you.         Sincerely,        Jose Juan Zuniga MD        CC: No Recipients    Jose Juan Zuniga MD  4/28/2025 11:48 AM  Sign when Signing Visit               Surgical Oncology Consult       [facility] [place]  [address] [address]  [phone]    Shawna Marrufo  1965  [id number]  [facility]  [address] [address]  [phone]    Chief Complaint   Patient presents with   • Post-op       Assessment/Plan:    No problem-specific Assessment & Plan notes found for this encounter.       Diagnoses and all orders for this visit:    Hyperparathyroidism (HCC)  -     PTH, intact; Future  -     Calcium; Future  -     Vitamin D 1,25 dihydroxy; Future    Hypercalcemia    S/P parathyroidectomy      Advance Care Planning/Advance Directives:  Discussed disease status, cancer treatment plans and/or cancer treatment goals with the patient.     Oncology History    No history exists.       History of Present Illness: 59-year-old woman here for postop check status post parathyroid resection for 4 gland hyperplasia.  Since the operation, she is felt better.  Her energy levels have improved significantly.  Aches and pains have also improved.  She has taken on a full-time job.    Review of Systems   Constitutional: Negative.    HENT: Negative.     Eyes: Negative.    Respiratory: Negative.     Cardiovascular: Negative.     Gastrointestinal: Negative.    Endocrine: Negative.    Genitourinary: Negative.    Musculoskeletal: Negative.    Skin: Negative.    Allergic/Immunologic: Negative.    Neurological: Negative.    Hematological: Negative.    Psychiatric/Behavioral: Negative.     All other systems reviewed and are negative.        Patient Active Problem List   Diagnosis   • Hyperparathyroidism (HCC)   • Hypercalcemia   • Other specified hypothyroidism   • Iron deficiency   • Osteopenia   • Vitamin D deficiency   • S/P parathyroidectomy     Past Medical History:   Diagnosis Date   • Disease of thyroid gland    • Headache(784.0)    • PONV (postoperative nausea and vomiting)      Past Surgical History:   Procedure Laterality Date   • CHG ASSAY OF PARATHORMONE N/A 4/17/2025    Procedure: MONITORING INTRAOPERATIVE PTH (PARATHYROID HORMONE);  Surgeon: Jose Juan Zuniga MD;  Location: [facility];  Service: Surgical Oncology   • KNEE SURGERY     • PARATHYROID GLAND SURGERY     • MD LARYNGOSCOPY FLEXIBLE DIAGNOSTIC N/A 4/17/2025    Procedure: DIAGNOSTIC FLEXIBLE LARYNGOSCOPY;  Surgeon: Jose Juan Zuniga MD;  Location[facility];  Service: Surgical Oncology   • MD PARATHYROIDECTOMY/EXPLORATION PARATHYROIDS Right 4/17/2025    Procedure: MINIMALLY INVASIVE RIGHT PARATHYROIDECTOMY,  FOUR GLAND EXPLORATION  TWO GLAND PARATHYROIDECTOMY;  Surgeon: Jose Juan Zuniga MD;  Location: [facility];  Service: Surgical Oncology     Family History   Problem Relation Age of Onset   • Stroke Mother         She is 90. Stroke occurred 1 year ago.   • Dementia Father    • Thyroid disease Sister    • Coronary artery disease Brother    • Heart disease Brother      Social History     Socioeconomic History   • Marital status: Single     Spouse name: Not on file   • Number of children: Not on file   • Years of education: Not on file   • Highest education level: Not on file   Occupational History   • Not on file   Tobacco Use   • Smoking status: Never   • Smokeless  tobacco: Never   Vaping Use   • Vaping status: Never Used   Substance and Sexual Activity   • Alcohol use: Not Currently     Comment: rarely   • Drug use: Never   • Sexual activity: Not Currently     Partners: Male     Birth control/protection: Post-menopausal   Other Topics Concern   • Not on file   Social History Narrative    Single    Previous work - in HR in [place]    Working - [employer]     Social Drivers of Health     Financial Resource Strain: Not on file   Food Insecurity: Not on file   Transportation Needs: Not on file   Physical Activity: Not on file   Stress: Not on file   Social Connections: Not on file   Intimate Partner Violence: Not on file   Housing Stability: Not on file       Current Outpatient Medications:   •  Cholecalciferol (Vitamin D3) 50 MCG (2000 UT) capsule, Take 1 capsule (2,000 Units total) by mouth daily, Disp: , Rfl:   •  levothyroxine (Synthroid) 50 mcg tablet, Take 1 tablet (50 mcg total) by mouth daily, Disp: 90 tablet, Rfl: 1  •  Omega-3 Fatty Acids (Fish Oil) 1200 MG CAPS, Take 1 capsule (1,200 mg total) by mouth Daily at 2am, Disp: , Rfl:   •  ondansetron (ZOFRAN) 4 mg tablet, Take 1 tablet (4 mg total) by mouth every 8 (eight) hours as needed for nausea or vomiting for up to 3 days, Disp: 10 tablet, Rfl: 0  •  traMADol (Ultram) 50 mg tablet, Take 1 tablet (50 mg total) by mouth every 6 (six) hours as needed for moderate pain, Disp: 10 tablet, Rfl: 0  Allergies   Allergen Reactions   • Sulfa Antibiotics Rash     Vitals:    04/28/25 1111   BP: 118/64   Pulse: 74   Resp: 16   Temp: 98.4 °F (36.9 °C)   SpO2: 98%       Physical Exam  Vitals reviewed.   Constitutional:       Appearance: Normal appearance.   HENT:      Head: Normocephalic and atraumatic.      Right Ear: External ear normal.      Left Ear: External ear normal.      Nose: Nose normal.   Eyes:      Extraocular Movements: Extraocular movements intact.      Pupils: Pupils are equal, round, and reactive to light.    Cardiovascular:      Rate and Rhythm: Regular rhythm.      Heart sounds: Normal heart sounds.   Pulmonary:      Effort: Pulmonary effort is normal.      Breath sounds: Normal breath sounds.   Abdominal:      General: Abdomen is flat.      Palpations: Abdomen is soft.   Musculoskeletal:         General: Normal range of motion.      Cervical back: Normal range of motion and neck supple.   Skin:     General: Skin is warm and dry.   Neurological:      General: No focal deficit present.      Mental Status: She is alert and oriented to person, place, and time.   Psychiatric:         Mood and Affect: Mood normal.         Behavior: Behavior normal.         Pathology:  Component  Ref Range & Units (hover)    Case Report   Surgical Pathology Report                         Case: [id number]                                   Authorizing Provider:  Jose Juan Zuniga MD        Collected:           04/17/2025 0724               Ordering Location:     [facility]      Received:            04/17/2025 0930                                      Hospital Operating Room                                                       Pathologist:           Swtea Angulo MD                                                         Intraop:               Sweta Angulo MD                                                         Specimens:   A) - Parathyroid, Right  inferior parathyroid                                                        B) - Parathyroid, LEFT INFERIOR PARATHYROID                                                          C) - Parathyroid, R/O RIGHT PARATHYROID                                                              D) - Parathyroid, R/O RIGHT PARATHYROID   #2                                              Final Diagnosis   A. Right inferior parathyroid (excision):  - Benign thyroid tissue with lymphocytic thyroiditis   - No parathyroid tissue identified     B. Left inferior parathyroid (excision):  - Cellular  "parathyroid tissue, and thyroid tissue (0.22g)     C. \"R/O parathyroid\" (excision):  - Benign thyroid tissue      D. \"R/O right parathyroid\" (excision):  - Cellular parathyroid tissue (0.10g)     Interpretation performed at [facility], [address] [place] [id number]            Comments:   This is an appended report. These results have been appended to a previously preliminary verified report.      Electronically signed by Sweta Angulo MD on 4/22/2025 at 1048 EDT     Labs:  Lab Results   Component Value Date    PTH 21.9 04/24/2025    CALCIUM 9.8 04/24/2025    PHOS 3.4 11/05/2024         Imaging  No results found.  I reviewed the above laboratory and imaging data.    Discussion/Summary: 59-year-old woman status post reoperation for parathyroid disease.  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  Left superior gland left intact.  Calcium levels are now normal.  Will follow-up in 6 months with blood work to assess for durability of operation.  All questions answered and copies of reports given to her for her records.        "

## 2025-04-28 NOTE — PROGRESS NOTES
Surgical Oncology Consult       [facility] [place]  [address] [facility] [address]  [phone]    Shawna Keithorestesdebbie  1965  [id number]  [facility] [place]  [address] [facility] [address]  [phone]    Chief Complaint   Patient presents with    Post-op       Assessment/Plan:    No problem-specific Assessment & Plan notes found for this encounter.       Diagnoses and all orders for this visit:    Hyperparathyroidism (HCC)  -     PTH, intact; Future  -     Calcium; Future  -     Vitamin D 1,25 dihydroxy; Future    Hypercalcemia    S/P parathyroidectomy      Advance Care Planning/Advance Directives:  Discussed disease status, cancer treatment plans and/or cancer treatment goals with the patient.     Oncology History    No history exists.       History of Present Illness: 59-year-old woman here for postop check status post parathyroid resection for 4 gland hyperplasia.  Since the operation, she is felt better.  Her energy levels have improved significantly.  Aches and pains have also improved.  She has taken on a full-time job.    Review of Systems   Constitutional: Negative.    HENT: Negative.     Eyes: Negative.    Respiratory: Negative.     Cardiovascular: Negative.    Gastrointestinal: Negative.    Endocrine: Negative.    Genitourinary: Negative.    Musculoskeletal: Negative.    Skin: Negative.    Allergic/Immunologic: Negative.    Neurological: Negative.    Hematological: Negative.    Psychiatric/Behavioral: Negative.     All other systems reviewed and are negative.        Patient Active Problem List   Diagnosis    Hyperparathyroidism (HCC)    Hypercalcemia    Other specified hypothyroidism    Iron deficiency    Osteopenia    Vitamin D deficiency    S/P parathyroidectomy     Past Medical History:   Diagnosis Date    Disease of thyroid gland      Headache(784.0)     PONV (postoperative nausea and vomiting)      Past Surgical History:   Procedure Laterality Date    CHG ASSAY OF PARATHORMONE N/A 4/17/2025    Procedure: MONITORING INTRAOPERATIVE PTH (PARATHYROID HORMONE);  Surgeon: Jose Juan Zuniga MD;  Location: [facility];  Service: Surgical Oncology    KNEE SURGERY      PARATHYROID GLAND SURGERY      MA LARYNGOSCOPY FLEXIBLE DIAGNOSTIC N/A 4/17/2025    Procedure: DIAGNOSTIC FLEXIBLE LARYNGOSCOPY;  Surgeon: Jose Juan Zuniga MD;  Location: [facility];  Service: Surgical Oncology    MA PARATHYROIDECTOMY/EXPLORATION PARATHYROIDS Right 4/17/2025    Procedure: MINIMALLY INVASIVE RIGHT PARATHYROIDECTOMY,  FOUR GLAND EXPLORATION  TWO GLAND PARATHYROIDECTOMY;  Surgeon: Jose Juan Zuniga MD;  Location: [facility];  Service: Surgical Oncology     Family History   Problem Relation Age of Onset    Stroke Mother         She is 90. Stroke occurred 1 year ago.    Dementia Father     Thyroid disease Sister     Coronary artery disease Brother     Heart disease Brother      Social History     Socioeconomic History    Marital status: Single     Spouse name: Not on file    Number of children: Not on file    Years of education: Not on file    Highest education level: Not on file   Occupational History    Not on file   Tobacco Use    Smoking status: Never    Smokeless tobacco: Never   Vaping Use    Vaping status: Never Used   Substance and Sexual Activity    Alcohol use: Not Currently     Comment: rarely    Drug use: Never    Sexual activity: Not Currently     Partners: Male     Birth control/protection: Post-menopausal   Other Topics Concern    Not on file   Social History Narrative    Single    Previous work - in HR in [place]    Working - [employer]     Social Drivers of Health     Financial Resource Strain: Not on file   Food Insecurity: Not on file   Transportation Needs: Not on file   Physical Activity: Not on file   Stress: Not on file   Social Connections: Not on file    Intimate Partner Violence: Not on file   Housing Stability: Not on file       Current Outpatient Medications:     Cholecalciferol (Vitamin D3) 50 MCG (2000 UT) capsule, Take 1 capsule (2,000 Units total) by mouth daily, Disp: , Rfl:     levothyroxine (Synthroid) 50 mcg tablet, Take 1 tablet (50 mcg total) by mouth daily, Disp: 90 tablet, Rfl: 1    Omega-3 Fatty Acids (Fish Oil) 1200 MG CAPS, Take 1 capsule (1,200 mg total) by mouth Daily at 2am, Disp: , Rfl:     ondansetron (ZOFRAN) 4 mg tablet, Take 1 tablet (4 mg total) by mouth every 8 (eight) hours as needed for nausea or vomiting for up to 3 days, Disp: 10 tablet, Rfl: 0    traMADol (Ultram) 50 mg tablet, Take 1 tablet (50 mg total) by mouth every 6 (six) hours as needed for moderate pain, Disp: 10 tablet, Rfl: 0  Allergies   Allergen Reactions    Sulfa Antibiotics Rash     Vitals:    04/28/25 1111   BP: 118/64   Pulse: 74   Resp: 16   Temp: 98.4 °F (36.9 °C)   SpO2: 98%       Physical Exam  Vitals reviewed.   Constitutional:       Appearance: Normal appearance.   HENT:      Head: Normocephalic and atraumatic.      Right Ear: External ear normal.      Left Ear: External ear normal.      Nose: Nose normal.   Eyes:      Extraocular Movements: Extraocular movements intact.      Pupils: Pupils are equal, round, and reactive to light.   Cardiovascular:      Rate and Rhythm: Regular rhythm.      Heart sounds: Normal heart sounds.   Pulmonary:      Effort: Pulmonary effort is normal.      Breath sounds: Normal breath sounds.   Abdominal:      General: Abdomen is flat.      Palpations: Abdomen is soft.   Musculoskeletal:         General: Normal range of motion.      Cervical back: Normal range of motion and neck supple.   Skin:     General: Skin is warm and dry.   Neurological:      General: No focal deficit present.      Mental Status: She is alert and oriented to person, place, and time.   Psychiatric:         Mood and Affect: Mood normal.         Behavior:  "Behavior normal.         Pathology:  Component  Ref Range & Units (hover)    Case Report   Surgical Pathology Report                         Case: [id number]                                   Authorizing Provider:  Jose Juan Zuniga MD        Collected:           04/17/2025 0724               Ordering Location:     [facility]      Received:            04/17/2025 09                                      Hospital Operating Room                                                       Pathologist:           Sweta Angulo MD                                                         Intraop:               Sweta Angulo MD                                                         Specimens:   A) - Parathyroid, Right  inferior parathyroid                                                        B) - Parathyroid, LEFT INFERIOR PARATHYROID                                                          C) - Parathyroid, R/O RIGHT PARATHYROID                                                              D) - Parathyroid, R/O RIGHT PARATHYROID   #2                                              Final Diagnosis   A. Right inferior parathyroid (excision):  - Benign thyroid tissue with lymphocytic thyroiditis   - No parathyroid tissue identified     B. Left inferior parathyroid (excision):  - Cellular parathyroid tissue, and thyroid tissue (0.22g)     C. \"R/O parathyroid\" (excision):  - Benign thyroid tissue      D. \"R/O right parathyroid\" (excision):  - Cellular parathyroid tissue (0.10g)     Interpretation performed at [facility] [place] [address]            Comments:   This is an appended report. These results have been appended to a previously preliminary verified report.      Electronically signed by Sweta Angulo MD on 4/22/2025 at 1048 EDT     Labs:  Lab Results   Component Value Date    PTH 21.9 04/24/2025    CALCIUM 9.8 04/24/2025    PHOS 3.4 11/05/2024         Imaging  No results found.  I " reviewed the above laboratory and imaging data.    Discussion/Summary: 59-year-old woman status post reoperation for parathyroid disease.  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  Left superior gland left intact.  Calcium levels are now normal.  Will follow-up in 6 months with blood work to assess for durability of operation.  All questions answered and copies of reports given to her for her records.

## 2025-04-29 ENCOUNTER — VBI (OUTPATIENT)
Dept: ADMINISTRATIVE | Facility: OTHER | Age: 60
End: 2025-04-29

## 2025-04-29 DIAGNOSIS — E03.8 OTHER SPECIFIED HYPOTHYROIDISM: ICD-10-CM

## 2025-04-29 RX ORDER — LEVOTHYROXINE SODIUM 50 UG/1
TABLET ORAL
Start: 2025-04-29

## 2025-04-29 NOTE — TELEPHONE ENCOUNTER
04/29/25 7:06 AM     Chart reviewed for   Cervical Cancer Screening    ; nothing is submitted to the patient's insurance at this time.     LESTER STANTON MA   [facility]

## 2025-05-14 ENCOUNTER — VBI (OUTPATIENT)
Dept: ADMINISTRATIVE | Facility: OTHER | Age: 60
End: 2025-05-14

## 2025-05-14 NOTE — TELEPHONE ENCOUNTER
05/14/25 10:25 AM     Chart reviewed for Pap Smear (HPV) aka Cervical Cancer Screening ; nothing is submitted to the patient's insurance at this time.     Polina Navarrete MA   [facility]

## (undated) DEVICE — SPONGE STICK WITH PVP-I: Brand: KENDALL

## (undated) DEVICE — REM POLYHESIVE ADULT PATIENT RETURN ELECTRODE: Brand: VALLEYLAB

## (undated) DEVICE — SURGICEL 4 X 8IN

## (undated) DEVICE — APPLIER SURGICLIP PREMIUM SMALL 9IN

## (undated) DEVICE — MINOR PROCEDURE DRAPE: Brand: CONVERTORS

## (undated) DEVICE — PACK UNIVERSAL NECK

## (undated) DEVICE — INSULATED BLADE ELECTRODE: Brand: EDGE

## (undated) DEVICE — SUT MONOCRYL 5-0 P-3 18 IN Y493G

## (undated) DEVICE — ASCOPE 4 RHINOLARYNGO SLIM: Brand: ASCOPE™ 4 RHINOLARYNGO SLIM

## (undated) DEVICE — GLOVE INDICATOR PI UNDERGLOVE SZ 7 BLUE

## (undated) DEVICE — SUT VICRYL 4-0 PS-2 27 IN J426H

## (undated) DEVICE — DRAPE SURGIKIT SADDLE BAG

## (undated) DEVICE — 3M™ STERI-STRIP™ COMPOUND BENZOIN TINCTURE 40 BAGS/CARTON 4 CARTONS/CASE C1544: Brand: 3M™ STERI-STRIP™

## (undated) DEVICE — LIGACLIP MCA MULTIPLE CLIP APPLIERS, 20 SMALL CLIPS: Brand: LIGACLIP

## (undated) DEVICE — ANTIBACTERIAL UNDYED BRAIDED (POLYGLACTIN 910), SYNTHETIC ABSORBABLE SUTURE: Brand: COATED VICRYL

## (undated) DEVICE — Device

## (undated) DEVICE — GLOVE SRG BIOGEL 7

## (undated) DEVICE — 3M™ STERI-STRIP™ REINFORCED ADHESIVE SKIN CLOSURES, R1547, 1/2 IN X 4 IN (12 MM X 100 MM), 6 STRIPS/ENVELOPE: Brand: 3M™ STERI-STRIP™